# Patient Record
Sex: FEMALE | Race: WHITE | NOT HISPANIC OR LATINO | URBAN - METROPOLITAN AREA
[De-identification: names, ages, dates, MRNs, and addresses within clinical notes are randomized per-mention and may not be internally consistent; named-entity substitution may affect disease eponyms.]

---

## 2023-03-03 PROBLEM — O26.899 RH NEGATIVE, MATERNAL (HHS-HCC): Status: ACTIVE | Noted: 2023-03-03

## 2023-03-03 PROBLEM — E55.9 HYPOVITAMINOSIS D: Status: ACTIVE | Noted: 2023-03-03

## 2023-03-03 PROBLEM — E03.9 HYPOTHYROIDISM: Status: ACTIVE | Noted: 2023-03-03

## 2023-03-03 PROBLEM — F32.A DEPRESSION: Status: ACTIVE | Noted: 2023-03-03

## 2023-03-03 PROBLEM — O99.019 ANEMIA IN PREGNANCY (HHS-HCC): Status: ACTIVE | Noted: 2023-03-03

## 2023-03-03 PROBLEM — G47.00 INSOMNIA: Status: ACTIVE | Noted: 2023-03-03

## 2023-03-03 PROBLEM — O91.219 MASTITIS, OBSTETRIC (HHS-HCC): Status: ACTIVE | Noted: 2023-03-03

## 2023-03-03 PROBLEM — F41.1 GENERALIZED ANXIETY DISORDER: Status: ACTIVE | Noted: 2023-03-03

## 2023-03-03 PROBLEM — A74.9 CHLAMYDIA: Status: ACTIVE | Noted: 2023-03-03

## 2023-03-03 PROBLEM — Z67.91 RH NEGATIVE, MATERNAL (HHS-HCC): Status: ACTIVE | Noted: 2023-03-03

## 2023-03-03 RX ORDER — BUPROPION HYDROCHLORIDE 150 MG/1
1 TABLET ORAL DAILY
COMMUNITY
Start: 2021-02-26 | End: 2023-03-15 | Stop reason: SDUPTHER

## 2023-03-03 RX ORDER — LEVOTHYROXINE SODIUM 75 UG/1
1 TABLET ORAL DAILY
COMMUNITY
Start: 2021-10-05 | End: 2023-03-17 | Stop reason: ALTCHOICE

## 2023-03-03 RX ORDER — ESCITALOPRAM OXALATE 10 MG/1
1 TABLET ORAL DAILY
COMMUNITY
Start: 2021-02-26 | End: 2023-03-15 | Stop reason: SDUPTHER

## 2023-03-15 ENCOUNTER — LAB (OUTPATIENT)
Dept: LAB | Facility: LAB | Age: 28
End: 2023-03-15
Payer: COMMERCIAL

## 2023-03-15 ENCOUNTER — OFFICE VISIT (OUTPATIENT)
Dept: PRIMARY CARE | Facility: CLINIC | Age: 28
End: 2023-03-15
Payer: COMMERCIAL

## 2023-03-15 VITALS
HEIGHT: 61 IN | HEART RATE: 71 BPM | BODY MASS INDEX: 37.19 KG/M2 | SYSTOLIC BLOOD PRESSURE: 108 MMHG | OXYGEN SATURATION: 98 % | TEMPERATURE: 98 F | WEIGHT: 197 LBS | RESPIRATION RATE: 16 BRPM | DIASTOLIC BLOOD PRESSURE: 68 MMHG

## 2023-03-15 DIAGNOSIS — E55.9 HYPOVITAMINOSIS D: Primary | ICD-10-CM

## 2023-03-15 DIAGNOSIS — E03.9 HYPOTHYROIDISM, UNSPECIFIED TYPE: ICD-10-CM

## 2023-03-15 DIAGNOSIS — F32.A DEPRESSION, UNSPECIFIED DEPRESSION TYPE: ICD-10-CM

## 2023-03-15 DIAGNOSIS — Z00.00 HEALTH MAINTENANCE EXAMINATION: ICD-10-CM

## 2023-03-15 DIAGNOSIS — Z00.00 HEALTH MAINTENANCE EXAMINATION: Primary | ICD-10-CM

## 2023-03-15 DIAGNOSIS — E55.9 HYPOVITAMINOSIS D: ICD-10-CM

## 2023-03-15 LAB
ALANINE AMINOTRANSFERASE (SGPT) (U/L) IN SER/PLAS: 9 U/L (ref 7–45)
ALBUMIN (G/DL) IN SER/PLAS: 4.4 G/DL (ref 3.4–5)
ALKALINE PHOSPHATASE (U/L) IN SER/PLAS: 79 U/L (ref 33–110)
ANION GAP IN SER/PLAS: 11 MMOL/L (ref 10–20)
ASPARTATE AMINOTRANSFERASE (SGOT) (U/L) IN SER/PLAS: 14 U/L (ref 9–39)
BILIRUBIN TOTAL (MG/DL) IN SER/PLAS: 0.4 MG/DL (ref 0–1.2)
CALCIDIOL (25 OH VITAMIN D3) (NG/ML) IN SER/PLAS: 13 NG/ML
CALCIUM (MG/DL) IN SER/PLAS: 9.3 MG/DL (ref 8.6–10.3)
CARBON DIOXIDE, TOTAL (MMOL/L) IN SER/PLAS: 27 MMOL/L (ref 21–32)
CHLORIDE (MMOL/L) IN SER/PLAS: 103 MMOL/L (ref 98–107)
CREATININE (MG/DL) IN SER/PLAS: 0.81 MG/DL (ref 0.5–1.05)
ERYTHROCYTE DISTRIBUTION WIDTH (RATIO) BY AUTOMATED COUNT: 16 % (ref 11.5–14.5)
ERYTHROCYTE MEAN CORPUSCULAR HEMOGLOBIN CONCENTRATION (G/DL) BY AUTOMATED: 29.8 G/DL (ref 32–36)
ERYTHROCYTE MEAN CORPUSCULAR VOLUME (FL) BY AUTOMATED COUNT: 77 FL (ref 80–100)
ERYTHROCYTES (10*6/UL) IN BLOOD BY AUTOMATED COUNT: 4.64 X10E12/L (ref 4–5.2)
GFR FEMALE: >90 ML/MIN/1.73M2
GLUCOSE (MG/DL) IN SER/PLAS: 74 MG/DL (ref 74–99)
HEMATOCRIT (%) IN BLOOD BY AUTOMATED COUNT: 35.9 % (ref 36–46)
HEMOGLOBIN (G/DL) IN BLOOD: 10.7 G/DL (ref 12–16)
LEUKOCYTES (10*3/UL) IN BLOOD BY AUTOMATED COUNT: 6.3 X10E9/L (ref 4.4–11.3)
PLATELETS (10*3/UL) IN BLOOD AUTOMATED COUNT: 186 X10E9/L (ref 150–450)
POC APPEARANCE, URINE: CLEAR
POC BILIRUBIN, URINE: NEGATIVE
POC BLOOD, URINE: NEGATIVE
POC COLOR, URINE: YELLOW
POC GLUCOSE, URINE: NEGATIVE MG/DL
POC KETONES, URINE: NEGATIVE MG/DL
POC LEUKOCYTES, URINE: NEGATIVE
POC NITRITE,URINE: NEGATIVE
POC PH, URINE: 6 PH
POC PROTEIN, URINE: NEGATIVE MG/DL
POC SPECIFIC GRAVITY, URINE: 1.01
POC UROBILINOGEN, URINE: 0.2 EU/DL
POTASSIUM (MMOL/L) IN SER/PLAS: 3.9 MMOL/L (ref 3.5–5.3)
PROTEIN TOTAL: 7.4 G/DL (ref 6.4–8.2)
SODIUM (MMOL/L) IN SER/PLAS: 137 MMOL/L (ref 136–145)
THYROTROPIN (MIU/L) IN SER/PLAS BY DETECTION LIMIT <= 0.05 MIU/L: 16.95 MIU/L (ref 0.44–3.98)
THYROXINE (T4) FREE (NG/DL) IN SER/PLAS: 0.7 NG/DL (ref 0.61–1.12)
UREA NITROGEN (MG/DL) IN SER/PLAS: 14 MG/DL (ref 6–23)

## 2023-03-15 PROCEDURE — 84439 ASSAY OF FREE THYROXINE: CPT

## 2023-03-15 PROCEDURE — 80053 COMPREHEN METABOLIC PANEL: CPT

## 2023-03-15 PROCEDURE — 1036F TOBACCO NON-USER: CPT | Performed by: FAMILY MEDICINE

## 2023-03-15 PROCEDURE — 36415 COLL VENOUS BLD VENIPUNCTURE: CPT

## 2023-03-15 PROCEDURE — 85027 COMPLETE CBC AUTOMATED: CPT

## 2023-03-15 PROCEDURE — 84443 ASSAY THYROID STIM HORMONE: CPT

## 2023-03-15 PROCEDURE — 82306 VITAMIN D 25 HYDROXY: CPT

## 2023-03-15 PROCEDURE — 81002 URINALYSIS NONAUTO W/O SCOPE: CPT | Performed by: FAMILY MEDICINE

## 2023-03-15 PROCEDURE — 99395 PREV VISIT EST AGE 18-39: CPT | Performed by: FAMILY MEDICINE

## 2023-03-15 RX ORDER — ESCITALOPRAM OXALATE 10 MG/1
10 TABLET ORAL DAILY
Qty: 90 TABLET | Refills: 1 | Status: SHIPPED | OUTPATIENT
Start: 2023-03-15 | End: 2023-12-28 | Stop reason: SDUPTHER

## 2023-03-15 RX ORDER — BUPROPION HYDROCHLORIDE 150 MG/1
150 TABLET ORAL DAILY
Qty: 90 TABLET | Refills: 1 | Status: SHIPPED | OUTPATIENT
Start: 2023-03-15 | End: 2023-12-28 | Stop reason: SDUPTHER

## 2023-03-15 ASSESSMENT — PATIENT HEALTH QUESTIONNAIRE - PHQ9
1. LITTLE INTEREST OR PLEASURE IN DOING THINGS: NOT AT ALL
2. FEELING DOWN, DEPRESSED OR HOPELESS: NOT AT ALL
SUM OF ALL RESPONSES TO PHQ9 QUESTIONS 1 AND 2: 0

## 2023-03-15 NOTE — PROGRESS NOTES
"Subjective   Patient ID: Yesy rOozco is a 27 y.o. female who presents for Annual Exam.    HPI   Dentist and Eye Doctor appointments: Due for both  Immunizations: defers flu shot  Diet: does \"ok\"   Exercise: walks her son   Supplements: None  Menstrual Cycles: Hasn't had one - still nursing   Pregnancy History:   Cancer Screening:    Cervical: Due; last was , wnl   Breast: None   Colon: None   Skin: None         Review of Systems    Objective   /68   Pulse 71   Temp 36.7 °C (98 °F)   Resp 16   Ht 1.549 m (5' 1\")   Wt 89.4 kg (197 lb)   SpO2 98%   Breastfeeding Yes   BMI 37.22 kg/m²     Physical Exam  Constitutional:       General: She is not in acute distress.     Appearance: She is well-developed. She is not diaphoretic.   HENT:      Head: Normocephalic and atraumatic.      Nose: Nose normal.   Eyes:      General: No scleral icterus.     Pupils: Pupils are equal, round, and reactive to light.   Neck:      Thyroid: No thyromegaly.      Vascular: No JVD.   Cardiovascular:      Rate and Rhythm: Normal rate and regular rhythm.      Heart sounds: Normal heart sounds. No murmur heard.     No friction rub. No gallop.   Pulmonary:      Effort: Pulmonary effort is normal. No respiratory distress.      Breath sounds: Normal breath sounds. No wheezing or rales.   Chest:      Chest wall: No tenderness.   Abdominal:      General: Bowel sounds are normal. There is no distension.      Palpations: Abdomen is soft. There is no mass.      Tenderness: There is no abdominal tenderness. There is no rebound.   Musculoskeletal:         General: Normal range of motion.      Cervical back: Normal range of motion and neck supple.   Lymphadenopathy:      Cervical: No cervical adenopathy.   Skin:     General: Skin is warm and dry.   Neurological:      Mental Status: She is alert and oriented to person, place, and time.      Deep Tendon Reflexes: Reflexes normal.         Assessment/Plan   Diagnoses and all orders for " this visit:  Health maintenance examination  -     POCT UA (nonautomated) manually resulted  Hypothyroidism, unspecified type  Hypovitaminosis D  Breast feeding status of mother

## 2023-03-15 NOTE — PATIENT INSTRUCTIONS
Today we performed your Annual Physical Exam.  Your vaccines are up to date.    Today we followed up on your Thyroid medication - we will check your labs and adjust the medication accordingly.  Our goal will be to have a TSH between 2 and 3.  We will closely monitor your symptoms to determine the optimal dosing.     We also followed up on your depression and anxiety and you are doing well so refills sent for your medications.     RTC for your pap smear only    Follow up in 6 months for a medication check

## 2023-03-17 RX ORDER — ERGOCALCIFEROL 1.25 MG/1
50000 CAPSULE ORAL
Qty: 12 CAPSULE | Refills: 0 | Status: SHIPPED | OUTPATIENT
Start: 2023-03-17 | End: 2023-06-09

## 2023-03-17 RX ORDER — LEVOTHYROXINE SODIUM 100 UG/1
100 TABLET ORAL DAILY
Qty: 30 TABLET | Refills: 5 | Status: SHIPPED | OUTPATIENT
Start: 2023-03-17 | End: 2023-12-08 | Stop reason: SDUPTHER

## 2023-03-24 ENCOUNTER — APPOINTMENT (OUTPATIENT)
Dept: PRIMARY CARE | Facility: CLINIC | Age: 28
End: 2023-03-24

## 2023-12-08 ENCOUNTER — TELEPHONE (OUTPATIENT)
Dept: PRIMARY CARE | Facility: CLINIC | Age: 28
End: 2023-12-08

## 2023-12-08 DIAGNOSIS — E03.9 HYPOTHYROIDISM, UNSPECIFIED TYPE: ICD-10-CM

## 2023-12-09 RX ORDER — LEVOTHYROXINE SODIUM 100 UG/1
100 TABLET ORAL DAILY
Qty: 30 TABLET | Refills: 0 | Status: SHIPPED | OUTPATIENT
Start: 2023-12-09 | End: 2024-01-30 | Stop reason: ALTCHOICE

## 2023-12-27 DIAGNOSIS — F32.A DEPRESSION, UNSPECIFIED DEPRESSION TYPE: ICD-10-CM

## 2023-12-27 DIAGNOSIS — Z00.00 HEALTH MAINTENANCE EXAMINATION: ICD-10-CM

## 2023-12-28 RX ORDER — BUPROPION HYDROCHLORIDE 150 MG/1
150 TABLET ORAL DAILY
Qty: 90 TABLET | Refills: 0 | Status: SHIPPED | OUTPATIENT
Start: 2023-12-28 | End: 2024-04-03

## 2023-12-28 RX ORDER — ESCITALOPRAM OXALATE 10 MG/1
10 TABLET ORAL DAILY
Qty: 90 TABLET | Refills: 0 | Status: SHIPPED | OUTPATIENT
Start: 2023-12-28 | End: 2024-04-03

## 2024-01-03 ENCOUNTER — OFFICE VISIT (OUTPATIENT)
Dept: PRIMARY CARE | Facility: CLINIC | Age: 29
End: 2024-01-03
Payer: COMMERCIAL

## 2024-01-03 VITALS
RESPIRATION RATE: 16 BRPM | SYSTOLIC BLOOD PRESSURE: 114 MMHG | BODY MASS INDEX: 36.44 KG/M2 | WEIGHT: 193 LBS | HEART RATE: 72 BPM | HEIGHT: 61 IN | DIASTOLIC BLOOD PRESSURE: 72 MMHG | OXYGEN SATURATION: 98 % | TEMPERATURE: 97.9 F

## 2024-01-03 DIAGNOSIS — D64.9 ANEMIA, UNSPECIFIED TYPE: ICD-10-CM

## 2024-01-03 DIAGNOSIS — E03.9 HYPOTHYROIDISM, UNSPECIFIED TYPE: Primary | ICD-10-CM

## 2024-01-03 DIAGNOSIS — E55.9 HYPOVITAMINOSIS D: ICD-10-CM

## 2024-01-03 PROCEDURE — 99214 OFFICE O/P EST MOD 30 MIN: CPT | Performed by: FAMILY MEDICINE

## 2024-01-03 PROCEDURE — 1036F TOBACCO NON-USER: CPT | Performed by: FAMILY MEDICINE

## 2024-01-03 ASSESSMENT — ENCOUNTER SYMPTOMS: DEPRESSION: 1

## 2024-01-03 NOTE — PATIENT INSTRUCTIONS
Today we followed up on your Thyroid medication - we will check your labs and adjust the medication accordingly.  Our goal will be to have a TSH between 2 and 3.  We will closely monitor your symptoms to determine the optimal dosing.     We also followed up on your anemia and you will need to have your cbc and iron and b12 checked    Lastly you are due for Vit D follow up so I have ordered this too

## 2024-01-03 NOTE — PROGRESS NOTES
"Subjective   Patient ID: Yesy Orozco is a 28 y.o. female who presents for Depression (Follow up ) and Hypothyroidism (Follow up ).    She hasn't been taking a prenatal vitamin she doesn't eat any red meat or pork or fish she does poultry sometimes.  She does eat leafy greens.  She is still nursing.    She was really anemic during pregnancy.  She was a vegetarian strict before. Her baby is 21 months old    She took 12 weeks of Vit D    She is due to have her thyroid labs checked but she took her meds already today.  She was out of her meds for a few days because she got sick and then she ran out.  She is back on it now and needs to have her labs checked.    Depression       Review of Systems   Psychiatric/Behavioral:  Positive for depression.        Objective   /72   Pulse 72   Temp 36.6 °C (97.9 °F)   Resp 16   Ht 1.549 m (5' 1\")   Wt 87.5 kg (193 lb)   SpO2 98%   Breastfeeding Yes   BMI 36.47 kg/m²     Physical Exam  Constitutional:       Appearance: Normal appearance.   HENT:      Head: Normocephalic and atraumatic.      Right Ear: Tympanic membrane normal.      Left Ear: Tympanic membrane normal.      Mouth/Throat:      Mouth: Mucous membranes are moist.   Eyes:      Pupils: Pupils are equal, round, and reactive to light.   Neck:      Thyroid: No thyroid mass, thyromegaly or thyroid tenderness.   Cardiovascular:      Rate and Rhythm: Normal rate and regular rhythm.   Pulmonary:      Effort: Pulmonary effort is normal.      Breath sounds: Normal breath sounds.   Musculoskeletal:      Cervical back: Normal range of motion and neck supple.   Skin:     General: Skin is warm and dry.   Neurological:      Mental Status: She is alert.         Assessment/Plan   Diagnoses and all orders for this visit:  Hypothyroidism, unspecified type  -     Thyroid Stimulating Hormone; Future  -     Thyroxine, Free; Future  Anemia, unspecified type  -     CBC and Auto Differential; Future  -     Iron and TIBC; Future  -  "    Vitamin B12; Future  -     Vitamin D 25 hydroxy; Future  Hypovitaminosis D

## 2024-01-26 ENCOUNTER — LAB (OUTPATIENT)
Dept: LAB | Facility: LAB | Age: 29
End: 2024-01-26
Payer: COMMERCIAL

## 2024-01-26 DIAGNOSIS — D64.9 ANEMIA, UNSPECIFIED TYPE: ICD-10-CM

## 2024-01-26 DIAGNOSIS — E03.9 HYPOTHYROIDISM, UNSPECIFIED TYPE: ICD-10-CM

## 2024-01-26 LAB
25(OH)D3 SERPL-MCNC: 13 NG/ML (ref 30–100)
BASOPHILS # BLD AUTO: 0.03 X10*3/UL (ref 0–0.1)
BASOPHILS NFR BLD AUTO: 0.5 %
EOSINOPHIL # BLD AUTO: 0.11 X10*3/UL (ref 0–0.7)
EOSINOPHIL NFR BLD AUTO: 1.9 %
ERYTHROCYTE [DISTWIDTH] IN BLOOD BY AUTOMATED COUNT: 14.1 % (ref 11.5–14.5)
HCT VFR BLD AUTO: 35.3 % (ref 36–46)
HGB BLD-MCNC: 11 G/DL (ref 12–16)
IMM GRANULOCYTES # BLD AUTO: 0.01 X10*3/UL (ref 0–0.7)
IMM GRANULOCYTES NFR BLD AUTO: 0.2 % (ref 0–0.9)
IRON SATN MFR SERPL: 8 % (ref 25–45)
IRON SERPL-MCNC: 31 UG/DL (ref 35–150)
LYMPHOCYTES # BLD AUTO: 1.38 X10*3/UL (ref 1.2–4.8)
LYMPHOCYTES NFR BLD AUTO: 23.5 %
MCH RBC QN AUTO: 25.3 PG (ref 26–34)
MCHC RBC AUTO-ENTMCNC: 31.2 G/DL (ref 32–36)
MCV RBC AUTO: 81 FL (ref 80–100)
MONOCYTES # BLD AUTO: 0.31 X10*3/UL (ref 0.1–1)
MONOCYTES NFR BLD AUTO: 5.3 %
NEUTROPHILS # BLD AUTO: 4.04 X10*3/UL (ref 1.2–7.7)
NEUTROPHILS NFR BLD AUTO: 68.6 %
NRBC BLD-RTO: 0 /100 WBCS (ref 0–0)
PLATELET # BLD AUTO: 169 X10*3/UL (ref 150–450)
RBC # BLD AUTO: 4.35 X10*6/UL (ref 4–5.2)
T4 FREE SERPL-MCNC: 0.73 NG/DL (ref 0.61–1.12)
TIBC SERPL-MCNC: 413 UG/DL (ref 240–445)
TSH SERPL-ACNC: 8.74 MIU/L (ref 0.44–3.98)
UIBC SERPL-MCNC: 382 UG/DL (ref 110–370)
VIT B12 SERPL-MCNC: 169 PG/ML (ref 211–911)
WBC # BLD AUTO: 5.9 X10*3/UL (ref 4.4–11.3)

## 2024-01-26 PROCEDURE — 85025 COMPLETE CBC W/AUTO DIFF WBC: CPT

## 2024-01-26 PROCEDURE — 83550 IRON BINDING TEST: CPT

## 2024-01-26 PROCEDURE — 82607 VITAMIN B-12: CPT

## 2024-01-26 PROCEDURE — 84439 ASSAY OF FREE THYROXINE: CPT

## 2024-01-26 PROCEDURE — 83540 ASSAY OF IRON: CPT

## 2024-01-26 PROCEDURE — 36415 COLL VENOUS BLD VENIPUNCTURE: CPT

## 2024-01-26 PROCEDURE — 84443 ASSAY THYROID STIM HORMONE: CPT

## 2024-01-26 PROCEDURE — 82306 VITAMIN D 25 HYDROXY: CPT

## 2024-01-27 DIAGNOSIS — E55.9 HYPOVITAMINOSIS D: Primary | ICD-10-CM

## 2024-01-27 RX ORDER — ERGOCALCIFEROL 1.25 MG/1
50000 CAPSULE ORAL
Qty: 12 CAPSULE | Refills: 0 | Status: SHIPPED | OUTPATIENT
Start: 2024-01-27 | End: 2024-04-20

## 2024-01-29 ENCOUNTER — TELEPHONE (OUTPATIENT)
Dept: PRIMARY CARE | Facility: CLINIC | Age: 29
End: 2024-01-29
Payer: COMMERCIAL

## 2024-01-29 DIAGNOSIS — E03.9 HYPOTHYROIDISM, UNSPECIFIED TYPE: ICD-10-CM

## 2024-01-29 RX ORDER — LEVOTHYROXINE SODIUM 100 UG/1
100 TABLET ORAL DAILY
Qty: 30 TABLET | Refills: 5 | Status: CANCELLED | OUTPATIENT
Start: 2024-01-29 | End: 2025-01-28

## 2024-01-30 DIAGNOSIS — E03.9 HYPOTHYROIDISM, UNSPECIFIED TYPE: Primary | ICD-10-CM

## 2024-01-30 RX ORDER — LEVOTHYROXINE SODIUM 112 UG/1
112 TABLET ORAL DAILY
Qty: 30 TABLET | Refills: 5 | Status: SHIPPED | OUTPATIENT
Start: 2024-01-30 | End: 2025-01-29

## 2024-04-03 DIAGNOSIS — Z00.00 HEALTH MAINTENANCE EXAMINATION: ICD-10-CM

## 2024-04-03 DIAGNOSIS — F32.A DEPRESSION, UNSPECIFIED DEPRESSION TYPE: ICD-10-CM

## 2024-04-03 RX ORDER — BUPROPION HYDROCHLORIDE 150 MG/1
150 TABLET ORAL DAILY
Qty: 90 TABLET | Refills: 0 | Status: SHIPPED | OUTPATIENT
Start: 2024-04-03

## 2024-04-03 RX ORDER — ESCITALOPRAM OXALATE 10 MG/1
10 TABLET ORAL DAILY
Qty: 90 TABLET | Refills: 0 | Status: SHIPPED | OUTPATIENT
Start: 2024-04-03

## 2024-07-17 DIAGNOSIS — Z00.00 HEALTH MAINTENANCE EXAMINATION: ICD-10-CM

## 2024-07-17 DIAGNOSIS — F32.A DEPRESSION, UNSPECIFIED DEPRESSION TYPE: ICD-10-CM

## 2024-07-17 RX ORDER — BUPROPION HYDROCHLORIDE 150 MG/1
150 TABLET ORAL DAILY
Qty: 90 TABLET | Refills: 0 | Status: SHIPPED | OUTPATIENT
Start: 2024-07-17

## 2024-07-17 RX ORDER — ESCITALOPRAM OXALATE 10 MG/1
10 TABLET ORAL DAILY
Qty: 90 TABLET | Refills: 0 | Status: SHIPPED | OUTPATIENT
Start: 2024-07-17

## 2024-08-10 DIAGNOSIS — E03.9 HYPOTHYROIDISM, UNSPECIFIED TYPE: ICD-10-CM

## 2024-08-12 RX ORDER — LEVOTHYROXINE SODIUM 112 UG/1
112 TABLET ORAL DAILY
Qty: 30 TABLET | Refills: 0 | Status: SHIPPED | OUTPATIENT
Start: 2024-08-12

## 2024-09-17 ENCOUNTER — APPOINTMENT (OUTPATIENT)
Dept: PRIMARY CARE | Facility: CLINIC | Age: 29
End: 2024-09-17
Payer: COMMERCIAL

## 2024-09-17 VITALS
BODY MASS INDEX: 39.95 KG/M2 | OXYGEN SATURATION: 98 % | DIASTOLIC BLOOD PRESSURE: 80 MMHG | WEIGHT: 211.6 LBS | SYSTOLIC BLOOD PRESSURE: 118 MMHG | HEART RATE: 83 BPM | HEIGHT: 61 IN | TEMPERATURE: 98.6 F | RESPIRATION RATE: 16 BRPM

## 2024-09-17 DIAGNOSIS — E03.9 HYPOTHYROIDISM, UNSPECIFIED TYPE: ICD-10-CM

## 2024-09-17 DIAGNOSIS — Z31.69 PRE-CONCEPTION COUNSELING: ICD-10-CM

## 2024-09-17 DIAGNOSIS — Z12.4 CERVICAL CANCER SCREENING: ICD-10-CM

## 2024-09-17 DIAGNOSIS — Z00.00 HEALTH MAINTENANCE EXAMINATION: Primary | ICD-10-CM

## 2024-09-17 LAB
POC APPEARANCE, URINE: CLEAR
POC BILIRUBIN, URINE: NEGATIVE
POC BLOOD, URINE: NEGATIVE
POC COLOR, URINE: YELLOW
POC GLUCOSE, URINE: NEGATIVE MG/DL
POC KETONES, URINE: NEGATIVE MG/DL
POC LEUKOCYTES, URINE: NEGATIVE
POC NITRITE,URINE: NEGATIVE
POC PH, URINE: 5 PH
POC PROTEIN, URINE: NEGATIVE MG/DL
POC SPECIFIC GRAVITY, URINE: 1.01
POC UROBILINOGEN, URINE: 0.2 EU/DL
PREGNANCY TEST URINE, POC: NEGATIVE

## 2024-09-17 PROCEDURE — 3008F BODY MASS INDEX DOCD: CPT | Performed by: FAMILY MEDICINE

## 2024-09-17 PROCEDURE — 1036F TOBACCO NON-USER: CPT | Performed by: FAMILY MEDICINE

## 2024-09-17 PROCEDURE — 81025 URINE PREGNANCY TEST: CPT | Performed by: FAMILY MEDICINE

## 2024-09-17 PROCEDURE — 99395 PREV VISIT EST AGE 18-39: CPT | Performed by: FAMILY MEDICINE

## 2024-09-17 PROCEDURE — 87624 HPV HI-RISK TYP POOLED RSLT: CPT

## 2024-09-17 PROCEDURE — 81002 URINALYSIS NONAUTO W/O SCOPE: CPT | Performed by: FAMILY MEDICINE

## 2024-09-17 ASSESSMENT — PATIENT HEALTH QUESTIONNAIRE - PHQ9
2. FEELING DOWN, DEPRESSED OR HOPELESS: NOT AT ALL
SUM OF ALL RESPONSES TO PHQ9 QUESTIONS 1 AND 2: 0
1. LITTLE INTEREST OR PLEASURE IN DOING THINGS: NOT AT ALL

## 2024-09-17 NOTE — PROGRESS NOTES
"Subjective   Patient ID: Yesy Orozco is a 29 y.o. female who presents for Annual Exam.    Dentist and Eye Doctor appointments: Not UTD doesn't have dental insurance.    Immunizations: due for flu   Diet: breastfeeding  Exercise: walk with her son  Supplements: PNV  Menstrual Cycles: regular  Sexually Active: Yes, No std concerns  Pregnancy History:  Cancer Screening:    Cervical: DUE   Breast: will be due when done breastfeeding   Colon: N/A   Skin: wears sunscreen on face   DEXA: N/A        HPI     Review of Systems    Objective   /80 (BP Location: Right arm, Patient Position: Sitting)   Pulse 83   Temp 37 °C (98.6 °F)   Resp 16   Ht 1.549 m (5' 1\")   Wt 96 kg (211 lb 9.6 oz)   LMP 2024 (Exact Date)   SpO2 98%   Breastfeeding Yes   BMI 39.98 kg/m²     Physical Exam  Constitutional:       General: She is not in acute distress.     Appearance: She is well-developed. She is not diaphoretic.   HENT:      Head: Normocephalic and atraumatic.      Right Ear: Tympanic membrane normal.      Left Ear: Tympanic membrane normal.      Nose: Nose normal.      Mouth/Throat:      Mouth: Mucous membranes are moist.   Eyes:      General: No scleral icterus.     Pupils: Pupils are equal, round, and reactive to light.   Neck:      Thyroid: No thyromegaly.      Vascular: No JVD.   Cardiovascular:      Rate and Rhythm: Normal rate and regular rhythm.      Heart sounds: Normal heart sounds. No murmur heard.     No friction rub. No gallop.   Pulmonary:      Effort: Pulmonary effort is normal. No respiratory distress.      Breath sounds: Normal breath sounds. No wheezing or rales.   Chest:      Chest wall: No tenderness.   Breasts:     Right: Normal.      Left: Normal.   Abdominal:      General: Bowel sounds are normal. There is no distension.      Palpations: Abdomen is soft. There is no mass.      Tenderness: There is no abdominal tenderness. There is no rebound.   Genitourinary:     General: Normal vulva.     "  Vagina: Normal.      Cervix: Normal.      Uterus: Normal.       Adnexa: Right adnexa normal and left adnexa normal.   Musculoskeletal:         General: Normal range of motion.      Cervical back: Normal range of motion and neck supple.   Lymphadenopathy:      Cervical: No cervical adenopathy.   Skin:     General: Skin is warm and dry.   Neurological:      General: No focal deficit present.      Mental Status: She is alert and oriented to person, place, and time.      Deep Tendon Reflexes: Reflexes normal.   Psychiatric:         Mood and Affect: Mood normal.         Behavior: Behavior normal.         Assessment/Plan   Diagnoses and all orders for this visit:  Health maintenance examination  -     POCT UA (nonautomated) manually resulted  -     CBC; Future  -     Comprehensive Metabolic Panel; Future  -     Vitamin D 25 hydroxy; Future  Cervical cancer screening  Comments:  2020  Orders:  -     THINPREP PAP TEST  Hypothyroidism, unspecified type  -     Thyroid Stimulating Hormone; Future  -     Thyroxine, Free; Future  Pre-conception counseling  -     POCT Pregnancy, Urine manually resulted

## 2024-09-17 NOTE — PATIENT INSTRUCTIONS
Today we performed your Annual Physical Exam.  Your preventative health care, labs and vaccinations have been reviewed and are up to date.      Flu shot is recommended.    Today we followed up on your Thyroid medication - we will check your labs and adjust the medication accordingly.  Our goal will be to have a TSH between 2 and 3.  We will closely monitor your symptoms to determine the optimal dosing.     Follow up in 6 months for a medication check    Follow up in 1 year for your Complete Physical Exam

## 2024-11-01 ENCOUNTER — LAB (OUTPATIENT)
Dept: LAB | Facility: LAB | Age: 29
End: 2024-11-01
Payer: COMMERCIAL

## 2024-11-01 DIAGNOSIS — R87.612 LGSIL OF CERVIX OF UNDETERMINED SIGNIFICANCE: ICD-10-CM

## 2024-11-01 DIAGNOSIS — E03.9 HYPOTHYROIDISM, UNSPECIFIED TYPE: ICD-10-CM

## 2024-11-01 DIAGNOSIS — E55.9 HYPOVITAMINOSIS D: ICD-10-CM

## 2024-11-01 DIAGNOSIS — D64.9 ANEMIA, UNSPECIFIED TYPE: Primary | ICD-10-CM

## 2024-11-01 DIAGNOSIS — Z00.00 HEALTH MAINTENANCE EXAMINATION: ICD-10-CM

## 2024-11-01 LAB
25(OH)D3 SERPL-MCNC: 24 NG/ML (ref 30–100)
ALBUMIN SERPL BCP-MCNC: 4.3 G/DL (ref 3.4–5)
ALP SERPL-CCNC: 55 U/L (ref 33–110)
ALT SERPL W P-5'-P-CCNC: 10 U/L (ref 7–45)
ANION GAP SERPL CALC-SCNC: 13 MMOL/L (ref 10–20)
AST SERPL W P-5'-P-CCNC: 12 U/L (ref 9–39)
BILIRUB SERPL-MCNC: 0.4 MG/DL (ref 0–1.2)
BUN SERPL-MCNC: 13 MG/DL (ref 6–23)
CALCIUM SERPL-MCNC: 8.9 MG/DL (ref 8.6–10.6)
CHLORIDE SERPL-SCNC: 103 MMOL/L (ref 98–107)
CO2 SERPL-SCNC: 26 MMOL/L (ref 21–32)
CREAT SERPL-MCNC: 0.77 MG/DL (ref 0.5–1.05)
EGFRCR SERPLBLD CKD-EPI 2021: >90 ML/MIN/1.73M*2
ERYTHROCYTE [DISTWIDTH] IN BLOOD BY AUTOMATED COUNT: 14.4 % (ref 11.5–14.5)
GLUCOSE SERPL-MCNC: 86 MG/DL (ref 74–99)
HCT VFR BLD AUTO: 35.9 % (ref 36–46)
HGB BLD-MCNC: 11.3 G/DL (ref 12–16)
IRON SATN MFR SERPL: 8 % (ref 25–45)
IRON SERPL-MCNC: 35 UG/DL (ref 35–150)
MCH RBC QN AUTO: 24.5 PG (ref 26–34)
MCHC RBC AUTO-ENTMCNC: 31.5 G/DL (ref 32–36)
MCV RBC AUTO: 78 FL (ref 80–100)
NRBC BLD-RTO: 0 /100 WBCS (ref 0–0)
PLATELET # BLD AUTO: 214 X10*3/UL (ref 150–450)
POTASSIUM SERPL-SCNC: 3.8 MMOL/L (ref 3.5–5.3)
PROT SERPL-MCNC: 6.9 G/DL (ref 6.4–8.2)
RBC # BLD AUTO: 4.62 X10*6/UL (ref 4–5.2)
SODIUM SERPL-SCNC: 138 MMOL/L (ref 136–145)
T4 FREE SERPL-MCNC: 1.1 NG/DL (ref 0.78–1.48)
TIBC SERPL-MCNC: 435 UG/DL (ref 240–445)
TSH SERPL-ACNC: 4 MIU/L (ref 0.44–3.98)
UIBC SERPL-MCNC: 400 UG/DL (ref 110–370)
VIT B12 SERPL-MCNC: 310 PG/ML (ref 211–911)
WBC # BLD AUTO: 7.3 X10*3/UL (ref 4.4–11.3)

## 2024-11-01 PROCEDURE — 85027 COMPLETE CBC AUTOMATED: CPT

## 2024-11-01 PROCEDURE — 82607 VITAMIN B-12: CPT

## 2024-11-01 PROCEDURE — 80053 COMPREHEN METABOLIC PANEL: CPT

## 2024-11-01 PROCEDURE — 84439 ASSAY OF FREE THYROXINE: CPT

## 2024-11-01 PROCEDURE — 84443 ASSAY THYROID STIM HORMONE: CPT

## 2024-11-01 PROCEDURE — 83540 ASSAY OF IRON: CPT

## 2024-11-01 PROCEDURE — 36415 COLL VENOUS BLD VENIPUNCTURE: CPT

## 2024-11-01 PROCEDURE — 82306 VITAMIN D 25 HYDROXY: CPT

## 2024-11-01 PROCEDURE — 83550 IRON BINDING TEST: CPT

## 2024-11-01 RX ORDER — ERGOCALCIFEROL 1.25 MG/1
50000 CAPSULE ORAL
Qty: 12 CAPSULE | Refills: 0 | Status: SHIPPED | OUTPATIENT
Start: 2024-11-03 | End: 2025-01-26

## 2024-11-04 DIAGNOSIS — E55.9 HYPOVITAMINOSIS D: Primary | ICD-10-CM

## 2024-11-04 DIAGNOSIS — E03.9 HYPOTHYROIDISM, UNSPECIFIED TYPE: ICD-10-CM

## 2024-11-04 RX ORDER — LEVOTHYROXINE SODIUM 125 UG/1
125 TABLET ORAL 3 TIMES WEEKLY
Qty: 36 TABLET | Refills: 1 | Status: SHIPPED | OUTPATIENT
Start: 2024-11-04 | End: 2025-11-04

## 2024-11-04 RX ORDER — ERGOCALCIFEROL 1.25 MG/1
50000 CAPSULE ORAL WEEKLY
Qty: 12 CAPSULE | Refills: 0 | Status: SHIPPED | OUTPATIENT
Start: 2024-11-04 | End: 2025-01-27

## 2024-11-04 RX ORDER — LEVOTHYROXINE SODIUM 112 UG/1
112 TABLET ORAL
Qty: 48 TABLET | Refills: 1 | Status: SHIPPED | OUTPATIENT
Start: 2024-11-05 | End: 2025-11-05

## 2024-11-11 DIAGNOSIS — F32.A DEPRESSION, UNSPECIFIED DEPRESSION TYPE: Primary | ICD-10-CM

## 2024-11-11 DIAGNOSIS — Z00.00 HEALTH MAINTENANCE EXAMINATION: ICD-10-CM

## 2024-11-11 RX ORDER — BUPROPION HYDROCHLORIDE 150 MG/1
150 TABLET ORAL DAILY
Qty: 90 TABLET | Refills: 1 | Status: SHIPPED | OUTPATIENT
Start: 2024-11-11

## 2024-11-11 RX ORDER — ESCITALOPRAM OXALATE 10 MG/1
10 TABLET ORAL DAILY
Qty: 90 TABLET | Refills: 1 | Status: SHIPPED | OUTPATIENT
Start: 2024-11-11

## 2024-12-09 ENCOUNTER — APPOINTMENT (OUTPATIENT)
Dept: OBSTETRICS AND GYNECOLOGY | Facility: CLINIC | Age: 29
End: 2024-12-09
Payer: COMMERCIAL

## 2024-12-09 VITALS — SYSTOLIC BLOOD PRESSURE: 105 MMHG | BODY MASS INDEX: 41.53 KG/M2 | DIASTOLIC BLOOD PRESSURE: 73 MMHG | WEIGHT: 219.8 LBS

## 2024-12-09 DIAGNOSIS — E03.9 HYPOTHYROIDISM, UNSPECIFIED TYPE: ICD-10-CM

## 2024-12-09 DIAGNOSIS — O21.9 NAUSEA AND VOMITING IN PREGNANCY PRIOR TO 22 WEEKS GESTATION: ICD-10-CM

## 2024-12-09 DIAGNOSIS — O26.891 RH NEGATIVE STATUS DURING PREGNANCY IN FIRST TRIMESTER (HHS-HCC): ICD-10-CM

## 2024-12-09 DIAGNOSIS — Z67.91 RH NEGATIVE STATUS DURING PREGNANCY IN FIRST TRIMESTER (HHS-HCC): ICD-10-CM

## 2024-12-09 DIAGNOSIS — Z34.90 PRENATAL CARE, ANTEPARTUM (HHS-HCC): ICD-10-CM

## 2024-12-09 DIAGNOSIS — Z87.59 HISTORY OF VACUUM EXTRACTION ASSISTED DELIVERY: ICD-10-CM

## 2024-12-09 DIAGNOSIS — F41.1 GENERALIZED ANXIETY DISORDER: ICD-10-CM

## 2024-12-09 DIAGNOSIS — Z3A.01 7 WEEKS GESTATION OF PREGNANCY (HHS-HCC): Primary | ICD-10-CM

## 2024-12-09 PROBLEM — Z00.00 HEALTH MAINTENANCE EXAMINATION: Status: RESOLVED | Noted: 2023-03-15 | Resolved: 2024-12-09

## 2024-12-09 PROBLEM — O99.019 ANEMIA IN PREGNANCY (HHS-HCC): Status: RESOLVED | Noted: 2023-03-03 | Resolved: 2024-12-09

## 2024-12-09 PROBLEM — F32.A DEPRESSION: Status: RESOLVED | Noted: 2023-03-03 | Resolved: 2024-12-09

## 2024-12-09 PROBLEM — G47.00 INSOMNIA: Status: RESOLVED | Noted: 2023-03-03 | Resolved: 2024-12-09

## 2024-12-09 PROBLEM — O91.219 MASTITIS, OBSTETRIC (HHS-HCC): Status: RESOLVED | Noted: 2023-03-03 | Resolved: 2024-12-09

## 2024-12-09 PROBLEM — A74.9 CHLAMYDIA: Status: RESOLVED | Noted: 2023-03-03 | Resolved: 2024-12-09

## 2024-12-09 PROBLEM — E55.9 HYPOVITAMINOSIS D: Status: RESOLVED | Noted: 2023-03-03 | Resolved: 2024-12-09

## 2024-12-09 PROBLEM — R87.612 LGSIL OF CERVIX OF UNDETERMINED SIGNIFICANCE: Status: RESOLVED | Noted: 2024-11-01 | Resolved: 2024-12-09

## 2024-12-09 PROBLEM — D64.9 ANEMIA: Status: RESOLVED | Noted: 2024-01-03 | Resolved: 2024-12-09

## 2024-12-09 PROCEDURE — 87591 N.GONORRHOEAE DNA AMP PROB: CPT

## 2024-12-09 PROCEDURE — 87491 CHLMYD TRACH DNA AMP PROBE: CPT

## 2024-12-09 PROCEDURE — 0500F INITIAL PRENATAL CARE VISIT: CPT | Performed by: OBSTETRICS & GYNECOLOGY

## 2024-12-09 PROCEDURE — 87086 URINE CULTURE/COLONY COUNT: CPT

## 2024-12-09 RX ORDER — ONDANSETRON 4 MG/1
4 TABLET, FILM COATED ORAL EVERY 8 HOURS PRN
Qty: 30 TABLET | Refills: 1 | Status: SHIPPED | OUTPATIENT
Start: 2024-12-09 | End: 2025-02-07

## 2024-12-09 RX ORDER — PNV 119/IRON FUM/FOLIC ACID 29 MG-1 MG
TABLET ORAL
COMMUNITY

## 2024-12-09 ASSESSMENT — EDINBURGH POSTNATAL DEPRESSION SCALE (EPDS)
I HAVE BEEN ANXIOUS OR WORRIED FOR NO GOOD REASON: YES, SOMETIMES
THE THOUGHT OF HARMING MYSELF HAS OCCURRED TO ME: NEVER
TOTAL SCORE: 8
I HAVE BLAMED MYSELF UNNECESSARILY WHEN THINGS WENT WRONG: YES, SOME OF THE TIME
THINGS HAVE BEEN GETTING ON TOP OF ME: NO, MOST OF THE TIME I HAVE COPED QUITE WELL
I HAVE FELT SCARED OR PANICKY FOR NO GOOD REASON: YES, SOMETIMES
I HAVE FELT SAD OR MISERABLE: NO, NOT AT ALL
I HAVE BEEN ABLE TO LAUGH AND SEE THE FUNNY SIDE OF THINGS: AS MUCH AS I ALWAYS COULD
I HAVE BEEN SO UNHAPPY THAT I HAVE BEEN CRYING: NO, NEVER
I HAVE LOOKED FORWARD WITH ENJOYMENT TO THINGS: AS MUCH AS I EVER DID
I HAVE BEEN SO UNHAPPY THAT I HAVE HAD DIFFICULTY SLEEPING: NOT VERY OFTEN

## 2024-12-09 NOTE — PROGRESS NOTES
Patient presents for initial OB visit  Urine culture & GC/CT sent today.  LMP: 10/17/2024  Flu: Declines   EPDS = 8  C/O: nausea and dry heaving     Deana Quan MA II    29 y.o.  at 7.4 weeks gestational age by sure LMP.  Planned and desired pregnancy.  USN done today - CRL c/w LMP dating.  EDC per LMP - .  PMH complicated by anxiety/depression - well controlled on meds.  Discussed risks of SSRI/SNRI use during pregnancy.  Discussed risk of  Poor Adjustment Syndrome (with symptoms including but not limited to irritability, feeding difficulties, increased respiratory rate).  Reviewed that severe symptoms are rare and overall symptoms typically are self-limited and resolve within a week.  Reviewed that there is no data to suggest that discontinuing these medications in the third trimester decreases risk to the . Discussed risk of worsening maternal symptoms at the end of pregnancy or postpartum if medications are discontinued in the third trimester.  Also has hypoythyroidism - will check TSH next visit with routine prenatal labs. OB history significant for prior term VAVD after pushing for 4.5 hours.  Has nausea but tolerating.  Wants zofran prescription - sent today.  Taking OTC PNV.   Recommended Flu vaccine- pt declines.  Recommended COVID vaccine- pt declines. BMI today Body mass index is 41.53 kg/m². Discussed optimal weight gain in pregnancy.  Discussed genetic screening options and carrier screening options and she will think about these.  Start ASA next visit.  13 week USN ordered and she will call to schedule.  Pt aware of collaborative care model and group practice from last pregnancy.  Questions answered.

## 2024-12-10 PROBLEM — O99.210 OBESITY IN PREGNANCY (HHS-HCC): Status: ACTIVE | Noted: 2024-12-10

## 2024-12-10 PROBLEM — Z87.59 HISTORY OF VACUUM EXTRACTION ASSISTED DELIVERY: Status: ACTIVE | Noted: 2024-12-10

## 2024-12-10 LAB
BACTERIA UR CULT: NORMAL
C TRACH RRNA SPEC QL NAA+PROBE: NEGATIVE
N GONORRHOEA DNA SPEC QL PROBE+SIG AMP: NEGATIVE

## 2025-01-14 ENCOUNTER — APPOINTMENT (OUTPATIENT)
Dept: OBSTETRICS AND GYNECOLOGY | Facility: CLINIC | Age: 30
End: 2025-01-14
Payer: COMMERCIAL

## 2025-01-14 VITALS — SYSTOLIC BLOOD PRESSURE: 108 MMHG | BODY MASS INDEX: 41.46 KG/M2 | DIASTOLIC BLOOD PRESSURE: 74 MMHG | WEIGHT: 219.4 LBS

## 2025-01-14 DIAGNOSIS — Z3A.12 12 WEEKS GESTATION OF PREGNANCY (HHS-HCC): Primary | ICD-10-CM

## 2025-01-14 DIAGNOSIS — F41.1 GENERALIZED ANXIETY DISORDER: ICD-10-CM

## 2025-01-14 DIAGNOSIS — O26.891 RH NEGATIVE STATUS DURING PREGNANCY IN FIRST TRIMESTER (HHS-HCC): ICD-10-CM

## 2025-01-14 DIAGNOSIS — Z67.91 RH NEGATIVE STATUS DURING PREGNANCY IN FIRST TRIMESTER (HHS-HCC): ICD-10-CM

## 2025-01-14 DIAGNOSIS — O99.210 OBESITY IN PREGNANCY (HHS-HCC): ICD-10-CM

## 2025-01-14 DIAGNOSIS — Z34.90 PRENATAL CARE, ANTEPARTUM (HHS-HCC): ICD-10-CM

## 2025-01-14 DIAGNOSIS — Z87.59 HISTORY OF VACUUM EXTRACTION ASSISTED DELIVERY: ICD-10-CM

## 2025-01-14 DIAGNOSIS — E03.9 HYPOTHYROIDISM, UNSPECIFIED TYPE: ICD-10-CM

## 2025-01-14 PROCEDURE — 0501F PRENATAL FLOW SHEET: CPT | Performed by: OBSTETRICS & GYNECOLOGY

## 2025-01-14 RX ORDER — NAPROXEN SODIUM 220 MG/1
162 TABLET, FILM COATED ORAL DAILY
Qty: 60 TABLET | Refills: 11 | Status: SHIPPED | OUTPATIENT
Start: 2025-01-14 | End: 2026-01-14

## 2025-01-14 NOTE — PROGRESS NOTES
Patient presents for OBFU & physical   Will have OB labs drawn at  lab, declines genetic testing  Pap: 2024 nml  C/O: Still having nausea and vomiting - stopped taking Zofran for palpitations      Deana Quan MA II    Routine prenatal visit     Subjective    HPI:  Feeling well overall.  Still has some nausea but overall feels that it is getting better.  Not taking zofran - felt that it caused palpitations.  Declines alternative anti-emetic at this time.  Has 13 week USN tomorrow.  Routine labs/TSH ordered and she will get done at  lab this week. Discussed starting ASA and prescription sent.     Objective    Vital Signs  /74   Wt 99.5 kg (219 lb 6.4 oz)   LMP 10/17/2024   BMI 41.46 kg/m²     Yesy Orozco is a 29 y.o. yo  at 12w5d here for the following concerns which we addressed today:     Medical Problems       Problem List       Generalized anxiety disorder    Overview Signed 2024  9:21 AM by Jewels Spear MD     - well controlled on welbutrin and lexapro          Hypothyroidism    Overview Signed 2024  9:21 AM by Jewels Spear MD     - on synthroid   <> TSH q trimester          Rh negative, maternal ([facility])    Overview Signed 2024  9:21 AM by Jewels Spear MD     <> Rhogam at 28 weeks          12 weeks gestation of pregnancy ([facility])    Overview Addendum 2025  1:59 PM by Jewels Spear MD     Desired provider in labor: [] CNM  [x] Physician  [x] Blood Products: [x] Yes, accepts [] No, needs counseling  [x] Initial BMI: 39.70   [] Prenatal Labs: <> ordered and pt will get done at  lab   [x] Cervical Cancer Screening up to date- normal    [] Rh status: negative   [] Genetic Screening:  Declines NIPS/carrier screening   [] NT US: (11-13 wks): scheduled 1/15   [x] Baby ASA (if indicated): started    [x] Pregnancy dated by: LMP c/w in-office USN at 7 weeks     [] Anatomy US: (19-20 wks)  [] Federal Sterilization consent signed  (if indicated):  [] 1hr GCT at 24-28wks:  [] Rhogam (if indicated):   [] Fetal Surveillance (if indicated):  [] Tdap (27-32 wks, may be given up to 36 wks if initial window missed):   [] RSV (32-36 wks) (Sept. to end of Jan):   [x] Flu Vaccine: Declines   [x] Updated COVID vaccine recommended - declines     [] Breastfeeding:  [] Postpartum Birth control method:   [] GBS at 36 - 37 wks:  [] 39 weeks discussion of IOL vs. Expectant management:  [] Mode of delivery ( anticipated ):           History of vacuum extraction assisted delivery    Overview Signed 12/10/2024 10:31 AM by Jewels Spear MD     - pushed for 4.5 hours, then VAVD  - Baby weighed 7lbs, 9oz          Obesity in pregnancy ([facility])    Overview Addendum 1/14/2025  1:59 PM by Jewels Spear MD     - BMI 42 at first visit  <> weekly testing at 34 weeks   <> 30 week growth USN              Follow up in 4 week(s).

## 2025-01-15 ENCOUNTER — HOSPITAL ENCOUNTER (OUTPATIENT)
Dept: RADIOLOGY | Facility: CLINIC | Age: 30
Discharge: HOME | End: 2025-01-15
Payer: COMMERCIAL

## 2025-01-15 DIAGNOSIS — Z34.90 PRENATAL CARE, ANTEPARTUM (HHS-HCC): ICD-10-CM

## 2025-01-15 PROCEDURE — 76813 OB US NUCHAL MEAS 1 GEST: CPT | Performed by: STUDENT IN AN ORGANIZED HEALTH CARE EDUCATION/TRAINING PROGRAM

## 2025-01-15 PROCEDURE — 76801 OB US < 14 WKS SINGLE FETUS: CPT

## 2025-01-15 PROCEDURE — 76813 OB US NUCHAL MEAS 1 GEST: CPT

## 2025-02-11 ENCOUNTER — APPOINTMENT (OUTPATIENT)
Dept: OBSTETRICS AND GYNECOLOGY | Facility: CLINIC | Age: 30
End: 2025-02-11
Payer: COMMERCIAL

## 2025-02-11 VITALS — WEIGHT: 221.4 LBS | BODY MASS INDEX: 41.83 KG/M2 | DIASTOLIC BLOOD PRESSURE: 71 MMHG | SYSTOLIC BLOOD PRESSURE: 107 MMHG

## 2025-02-11 DIAGNOSIS — Z87.59 HISTORY OF VACUUM EXTRACTION ASSISTED DELIVERY: ICD-10-CM

## 2025-02-11 DIAGNOSIS — E03.9 HYPOTHYROIDISM, UNSPECIFIED TYPE: ICD-10-CM

## 2025-02-11 DIAGNOSIS — Z3A.16 16 WEEKS GESTATION OF PREGNANCY (HHS-HCC): Primary | ICD-10-CM

## 2025-02-11 DIAGNOSIS — O99.210 OBESITY IN PREGNANCY (HHS-HCC): ICD-10-CM

## 2025-02-11 DIAGNOSIS — F41.1 GENERALIZED ANXIETY DISORDER: ICD-10-CM

## 2025-02-11 DIAGNOSIS — Z34.90 PRENATAL CARE, ANTEPARTUM (HHS-HCC): ICD-10-CM

## 2025-02-11 LAB
ERYTHROCYTE [DISTWIDTH] IN BLOOD BY AUTOMATED COUNT: 16 % (ref 11.5–14.5)
HCT VFR BLD AUTO: 34.6 % (ref 36–46)
HGB BLD-MCNC: 10.8 G/DL (ref 12–16)
MCH RBC QN AUTO: 24.9 PG (ref 26–34)
MCHC RBC AUTO-ENTMCNC: 31.2 G/DL (ref 32–36)
MCV RBC AUTO: 80 FL (ref 80–100)
NRBC BLD-RTO: 0 /100 WBCS (ref 0–0)
PLATELET # BLD AUTO: 186 X10*3/UL (ref 150–450)
RBC # BLD AUTO: 4.33 X10*6/UL (ref 4–5.2)
WBC # BLD AUTO: 8.9 X10*3/UL (ref 4.4–11.3)

## 2025-02-11 PROCEDURE — 83550 IRON BINDING TEST: CPT

## 2025-02-11 PROCEDURE — 82607 VITAMIN B-12: CPT

## 2025-02-11 PROCEDURE — 86850 RBC ANTIBODY SCREEN: CPT

## 2025-02-11 PROCEDURE — 86901 BLOOD TYPING SEROLOGIC RH(D): CPT

## 2025-02-11 PROCEDURE — 86900 BLOOD TYPING SEROLOGIC ABO: CPT

## 2025-02-11 PROCEDURE — 82728 ASSAY OF FERRITIN: CPT

## 2025-02-11 PROCEDURE — 0501F PRENATAL FLOW SHEET: CPT | Performed by: OBSTETRICS & GYNECOLOGY

## 2025-02-11 PROCEDURE — 82746 ASSAY OF FOLIC ACID SERUM: CPT

## 2025-02-11 PROCEDURE — 85027 COMPLETE CBC AUTOMATED: CPT

## 2025-02-11 NOTE — PROGRESS NOTES
"Patient presents for OBFU  C/O: pain in thighs at night, \"pins & needles\"     Deana Quan MA II    Routine prenatal visit     Subjective    HPI:  Feeling well in general.  Has some numbness on her thighs at night when she's in her bed.  Also had this in her prior pregnancy.  Discussed support pillows, etc,  Has not done routine labs - sent from office today.  Taking ASA without issue.  Had 13 week USN which was WNL.  Has anatomy USN scheduled.     Objective    Vital Signs  /71   Wt 100 kg (221 lb 6.4 oz)   LMP 10/17/2024   BMI 41.83 kg/m²     Yesy Orozco is a 29 y.o. yo  at 16w5d here for the following concerns which we addressed today:     Medical Problems       Problem List       Generalized anxiety disorder    Overview Signed 2024  9:21 AM by Jewels Spear MD     - well controlled on welbutrin and lexapro          Hypothyroidism    Overview Signed 2024  9:21 AM by Jewels Spear MD     - on synthroid   <> TSH q trimester          Rh negative, maternal ([facility])    Overview Signed 2024  9:21 AM by Jewels Spear MD     <> Rhogam at 28 weeks          16 weeks gestation of pregnancy ([facility])    Overview Addendum 2025 10:35 AM by Jewels Spear MD     Desired provider in labor: [] CNM  [x] Physician  [x] Blood Products: [x] Yes, accepts [] No, needs counseling  [x] Initial BMI: 39.70   [] Prenatal Labs: <> done in clinic today    [x] Cervical Cancer Screening up to date- normal    [x] Rh status: negative   [x] Genetic Screening:  Declines NIPS/carrier screening   [] NT US: (11-13 wks): scheduled 1/15   [x] Baby ASA (if indicated): started    [x] Pregnancy dated by: LMP c/w in-office USN at 7 weeks     [] Anatomy US: (19-20 wks): scheduled   [] Federal Sterilization consent signed (if indicated):  [] 1hr GCT at 24-28wks:  [] Rhogam (if indicated):   [] Fetal Surveillance (if indicated):  [] Tdap (27-32 wks, may be given up to 36 wks if " initial window missed):   [x] Flu Vaccine: Declines   [x] Updated COVID vaccine recommended - declines     [] Breastfeeding:  [] Postpartum Birth control method:   [] GBS at 36 - 37 wks:  [] 39 weeks discussion of IOL vs. Expectant management:  [] Mode of delivery ( anticipated ):           History of vacuum extraction assisted delivery    Overview Signed 12/10/2024 10:31 AM by Jewels Spear MD     - pushed for 4.5 hours, then VAVD  - Baby weighed 7lbs, 9oz          Obesity in pregnancy ([facility])    Overview Addendum 1/14/2025  1:59 PM by Jewels Spear MD     - BMI 42 at first visit  <> weekly testing at 34 weeks   <> 30 week growth USN              Follow up in 4 week(s).

## 2025-02-12 LAB
ABO GROUP (TYPE) IN BLOOD: NORMAL
ANTIBODY SCREEN: NORMAL
FERRITIN SERPL-MCNC: 12 NG/ML
FOLATE SERPL-MCNC: 15.4 NG/ML
IRON SATN MFR SERPL: NORMAL %
IRON SERPL-MCNC: 50 UG/DL
REFLEX ADDED, ANEMIA PANEL: NORMAL
RH FACTOR (ANTIGEN D): NORMAL
TIBC SERPL-MCNC: NORMAL UG/DL
UIBC SERPL-MCNC: >450 UG/DL
VIT B12 SERPL-MCNC: 254 PG/ML

## 2025-02-14 DIAGNOSIS — E03.9 HYPOTHYROIDISM, UNSPECIFIED TYPE: ICD-10-CM

## 2025-02-15 LAB
HBA1C MFR BLD: NORMAL % OF TOTAL HGB
HBV SURFACE AG SERPL QL IA: NORMAL
HCV AB SERPL QL IA: NORMAL
HIV 1+2 AB+HIV1 P24 AG SERPL QL IA: NORMAL
RUBV IGG SERPL IA-ACNC: 1.72 INDEX
T PALLIDUM AB SER QL IA: NEGATIVE
TSH SERPL-ACNC: 4.48 MIU/L

## 2025-02-18 RX ORDER — LEVOTHYROXINE SODIUM 125 UG/1
125 TABLET ORAL DAILY
Qty: 30 TABLET | Refills: 11 | Status: SHIPPED | OUTPATIENT
Start: 2025-02-18 | End: 2026-02-18

## 2025-03-06 ENCOUNTER — HOSPITAL ENCOUNTER (OUTPATIENT)
Dept: RADIOLOGY | Facility: CLINIC | Age: 30
Discharge: HOME | End: 2025-03-06
Payer: COMMERCIAL

## 2025-03-06 DIAGNOSIS — E07.9 THYROID DISEASE DURING PREGNANCY IN THIRD TRIMESTER (HHS-HCC): ICD-10-CM

## 2025-03-06 DIAGNOSIS — E66.813 CLASS 3 OBESITY: ICD-10-CM

## 2025-03-06 DIAGNOSIS — O35.9XX0 MATERNAL CARE FOR (SUSPECTED) FETAL ABNORMALITY AND DAMAGE, UNSPECIFIED, NOT APPLICABLE OR UNSPECIFIED: ICD-10-CM

## 2025-03-06 DIAGNOSIS — O99.283 THYROID DISEASE DURING PREGNANCY IN THIRD TRIMESTER (HHS-HCC): ICD-10-CM

## 2025-03-06 DIAGNOSIS — Z34.90 PRENATAL CARE, ANTEPARTUM (HHS-HCC): ICD-10-CM

## 2025-03-06 PROCEDURE — 76811 OB US DETAILED SNGL FETUS: CPT

## 2025-03-11 ENCOUNTER — APPOINTMENT (OUTPATIENT)
Dept: OBSTETRICS AND GYNECOLOGY | Facility: CLINIC | Age: 30
End: 2025-03-11
Payer: COMMERCIAL

## 2025-04-11 ENCOUNTER — APPOINTMENT (OUTPATIENT)
Dept: OBSTETRICS AND GYNECOLOGY | Facility: CLINIC | Age: 30
End: 2025-04-11
Payer: COMMERCIAL

## 2025-04-11 VITALS — WEIGHT: 224 LBS | DIASTOLIC BLOOD PRESSURE: 67 MMHG | SYSTOLIC BLOOD PRESSURE: 96 MMHG | BODY MASS INDEX: 42.32 KG/M2

## 2025-04-11 DIAGNOSIS — O26.892 RH NEGATIVE STATUS DURING PREGNANCY IN SECOND TRIMESTER (HHS-HCC): ICD-10-CM

## 2025-04-11 DIAGNOSIS — Z34.92 PRENATAL CARE IN SECOND TRIMESTER, UNSPECIFIED GRAVIDITY: ICD-10-CM

## 2025-04-11 DIAGNOSIS — F41.1 GENERALIZED ANXIETY DISORDER: ICD-10-CM

## 2025-04-11 DIAGNOSIS — Z87.59 HISTORY OF VACUUM EXTRACTION ASSISTED DELIVERY: ICD-10-CM

## 2025-04-11 DIAGNOSIS — Z3A.25 25 WEEKS GESTATION OF PREGNANCY (HHS-HCC): Primary | ICD-10-CM

## 2025-04-11 DIAGNOSIS — O99.210 OBESITY IN PREGNANCY (HHS-HCC): ICD-10-CM

## 2025-04-11 DIAGNOSIS — Z13.1 SCREENING FOR DIABETES MELLITUS: ICD-10-CM

## 2025-04-11 DIAGNOSIS — Z67.91 RH NEGATIVE STATUS DURING PREGNANCY IN SECOND TRIMESTER (HHS-HCC): ICD-10-CM

## 2025-04-11 PROCEDURE — 0501F PRENATAL FLOW SHEET: CPT | Performed by: OBSTETRICS & GYNECOLOGY

## 2025-04-11 NOTE — PROGRESS NOTES
Routine prenatal visit     Subjective    HPI:  Feeling well overall - no questions or concerns today.  Missed last appt due to GI virus in her and her family.  Reviewed normal anatomy USN. Has 30 week growth USN scheduled.  Glucola/CBC/syphilis screen and repeat TSH ordered and she will do at lab prior to her next appt.  Rhogam and Tdap at next appt.     Objective    Vital Signs  BP 96/67   Wt 102 kg (224 lb)   LMP 10/17/2024   BMI 42.32 kg/m²     Yesy Orozco is a 29 y.o. yo  at 25w1d here for the following concerns which we addressed today:     Medical Problems       Problem List       Generalized anxiety disorder    Overview Signed 2024  9:21 AM by Jewels Spear MD     - well controlled on welbutrin and lexapro          Hypothyroidism    Overview Signed 2024  9:21 AM by Jewels Spear MD     - on synthroid   <> TSH q trimester          Rh negative, maternal ([facility])    Overview Signed 2024  9:21 AM by Jewels Spear MD     <> Rhogam at 28 weeks          25 weeks gestation of pregnancy ([facility])    Overview Addendum 2025 11:02 AM by Jewels Spear MD     Desired provider in labor: [] CNM  [x] Physician  [x] Blood Products: [x] Yes, accepts [] No, needs counseling  [x] Initial BMI: 39.70   [x] Prenatal Labs: UTD  [x] Cervical Cancer Screening up to date- normal    [x] Rh status: negative   [x] Genetic Screening:  Declines NIPS/carrier screening   [x] NT US: (11-13 wks): WNL  [x] Baby ASA (if indicated): started    [x] Pregnancy dated by: LMP c/w in-office USN at 7 weeks     [x] Anatomy US: (19-20 wks): WNL  [] Federal Sterilization consent signed (if indicated):  [] 1hr GCT at 24-28wks: <> order placed and she will do prior to next appt   [] Rhogam: <> next appt   [] Fetal Surveillance: <> NSTs at 34 weeks   [] Tdap: <> give at next appt   [x] Flu Vaccine: Declines   [x] Updated COVID vaccine recommended - declines     [] Breastfeeding:  []  Postpartum Birth control method:   [] GBS at 36 - 37 wks:  [] 39 weeks discussion of IOL vs. Expectant management:  [] Mode of delivery ( anticipated ):           History of vacuum extraction assisted delivery    Overview Signed 12/10/2024 10:31 AM by Jewels Spear MD     - pushed for 4.5 hours, then VAVD  - Baby weighed 7lbs, 9oz          Obesity in pregnancy ([facility])    Overview Addendum 1/14/2025  1:59 PM by Jewels Spear MD     - BMI 42 at first visit  <> weekly testing at 34 weeks   <> 30 week growth USN              Follow up in 3 week(s).

## 2025-04-16 PROCEDURE — 85027 COMPLETE CBC AUTOMATED: CPT

## 2025-04-17 ENCOUNTER — TELEPHONE (OUTPATIENT)
Dept: OBSTETRICS AND GYNECOLOGY | Facility: CLINIC | Age: 30
End: 2025-04-17

## 2025-04-17 ENCOUNTER — LAB (OUTPATIENT)
Dept: LAB | Facility: HOSPITAL | Age: 30
End: 2025-04-17
Payer: COMMERCIAL

## 2025-04-17 DIAGNOSIS — Z3A.25 25 WEEKS GESTATION OF PREGNANCY (HHS-HCC): ICD-10-CM

## 2025-04-17 DIAGNOSIS — Z13.1 ENCOUNTER FOR SCREENING FOR DIABETES MELLITUS: ICD-10-CM

## 2025-04-17 DIAGNOSIS — Z34.92 ENCOUNTER FOR SUPERVISION OF NORMAL PREGNANCY, UNSPECIFIED, SECOND TRIMESTER: Primary | ICD-10-CM

## 2025-04-17 LAB
ERYTHROCYTE [DISTWIDTH] IN BLOOD BY AUTOMATED COUNT: 17 % (ref 11.5–14.5)
HCT VFR BLD AUTO: 31.3 % (ref 36–46)
HGB BLD-MCNC: 9.4 G/DL (ref 12–16)
MCH RBC QN AUTO: 25.8 PG (ref 26–34)
MCHC RBC AUTO-ENTMCNC: 30 G/DL (ref 32–36)
MCV RBC AUTO: 86 FL (ref 80–100)
NRBC BLD-RTO: 0 /100 WBCS (ref 0–0)
PLATELET # BLD AUTO: 164 X10*3/UL (ref 150–450)
RBC # BLD AUTO: 3.64 X10*6/UL (ref 4–5.2)
REFLEX ADDED, ANEMIA PANEL: NORMAL
WBC # BLD AUTO: 8.3 X10*3/UL (ref 4.4–11.3)

## 2025-04-17 NOTE — TELEPHONE ENCOUNTER
Client services called.. entire anemia panel canceled , only rcvd the CBC tube, per Erika in client services   Please advise

## 2025-04-17 NOTE — TELEPHONE ENCOUNTER
Received a call from Sue with  client services  Stated Ferritin and iron was cancelled, was never received in the lab

## 2025-04-18 PROBLEM — O99.019 ANTEPARTUM ANEMIA: Status: ACTIVE | Noted: 2025-04-18

## 2025-04-19 LAB
GLUCOSE 1H P 50 G GLC PO SERPL-MCNC: 108 MG/DL
T PALLIDUM AB SER QL IA: NEGATIVE
TSH SERPL-ACNC: 3.3 MIU/L

## 2025-04-22 DIAGNOSIS — O99.019 ANTEPARTUM ANEMIA: ICD-10-CM

## 2025-04-28 ENCOUNTER — LAB (OUTPATIENT)
Dept: LAB | Facility: HOSPITAL | Age: 30
End: 2025-04-28
Payer: COMMERCIAL

## 2025-04-28 DIAGNOSIS — Z34.90 ENCOUNTER FOR SUPERVISION OF NORMAL PREGNANCY, UNSPECIFIED, UNSPECIFIED TRIMESTER: Primary | ICD-10-CM

## 2025-04-28 DIAGNOSIS — Z34.90 PRENATAL CARE, ANTEPARTUM: ICD-10-CM

## 2025-04-28 LAB
ABO GROUP (TYPE) IN BLOOD: NORMAL
ANTIBODY SCREEN: NORMAL
ERYTHROCYTE [DISTWIDTH] IN BLOOD BY AUTOMATED COUNT: 16.6 % (ref 11.5–14.5)
HCT VFR BLD AUTO: 30.4 % (ref 36–46)
HGB BLD-MCNC: 9.3 G/DL (ref 12–16)
MCH RBC QN AUTO: 25 PG (ref 26–34)
MCHC RBC AUTO-ENTMCNC: 30.6 G/DL (ref 32–36)
MCV RBC AUTO: 82 FL (ref 80–100)
NRBC BLD-RTO: 0 /100 WBCS (ref 0–0)
PLATELET # BLD AUTO: 150 X10*3/UL (ref 150–450)
RBC # BLD AUTO: 3.72 X10*6/UL (ref 4–5.2)
REFLEX ADDED, ANEMIA PANEL: NORMAL
RH FACTOR (ANTIGEN D): NORMAL
WBC # BLD AUTO: 8 X10*3/UL (ref 4.4–11.3)

## 2025-04-28 PROCEDURE — 86901 BLOOD TYPING SEROLOGIC RH(D): CPT

## 2025-04-28 PROCEDURE — 86850 RBC ANTIBODY SCREEN: CPT

## 2025-04-28 PROCEDURE — 86900 BLOOD TYPING SEROLOGIC ABO: CPT

## 2025-04-28 PROCEDURE — 36415 COLL VENOUS BLD VENIPUNCTURE: CPT

## 2025-04-28 PROCEDURE — 85027 COMPLETE CBC AUTOMATED: CPT

## 2025-04-29 ENCOUNTER — APPOINTMENT (OUTPATIENT)
Dept: OBSTETRICS AND GYNECOLOGY | Facility: CLINIC | Age: 30
End: 2025-04-29
Payer: COMMERCIAL

## 2025-04-29 VITALS — DIASTOLIC BLOOD PRESSURE: 60 MMHG | BODY MASS INDEX: 42.89 KG/M2 | SYSTOLIC BLOOD PRESSURE: 106 MMHG | WEIGHT: 227 LBS

## 2025-04-29 DIAGNOSIS — Z87.59 HISTORY OF VACUUM EXTRACTION ASSISTED DELIVERY: ICD-10-CM

## 2025-04-29 DIAGNOSIS — O99.019 ANTEPARTUM ANEMIA: ICD-10-CM

## 2025-04-29 DIAGNOSIS — O26.899 RH NEGATIVE, ANTEPARTUM (HHS-HCC): ICD-10-CM

## 2025-04-29 DIAGNOSIS — Z67.91 RH NEGATIVE, ANTEPARTUM (HHS-HCC): ICD-10-CM

## 2025-04-29 DIAGNOSIS — Z3A.27 27 WEEKS GESTATION OF PREGNANCY (HHS-HCC): Primary | ICD-10-CM

## 2025-04-29 DIAGNOSIS — O99.012 ANEMIA DURING PREGNANCY IN SECOND TRIMESTER: ICD-10-CM

## 2025-04-29 DIAGNOSIS — O99.210 OBESITY IN PREGNANCY (HHS-HCC): ICD-10-CM

## 2025-04-29 PROCEDURE — 0501F PRENATAL FLOW SHEET: CPT | Performed by: OBSTETRICS & GYNECOLOGY

## 2025-04-29 PROCEDURE — 96372 THER/PROPH/DIAG INJ SC/IM: CPT | Performed by: OBSTETRICS & GYNECOLOGY

## 2025-04-29 RX ORDER — FERROUS SULFATE 137(45) MG
1 TABLET, EXTENDED RELEASE ORAL DAILY
Qty: 30 TABLET | Refills: 11 | Status: SHIPPED | OUTPATIENT
Start: 2025-04-29 | End: 2026-04-29

## 2025-04-29 ASSESSMENT — EDINBURGH POSTNATAL DEPRESSION SCALE (EPDS)
I HAVE BLAMED MYSELF UNNECESSARILY WHEN THINGS WENT WRONG: NOT VERY OFTEN
I HAVE LOOKED FORWARD WITH ENJOYMENT TO THINGS: AS MUCH AS I EVER DID
I HAVE BEEN SO UNHAPPY THAT I HAVE HAD DIFFICULTY SLEEPING: NOT AT ALL
I HAVE BEEN SO UNHAPPY THAT I HAVE BEEN CRYING: NO, NEVER
I HAVE FELT SAD OR MISERABLE: NO, NOT AT ALL
I HAVE BEEN ANXIOUS OR WORRIED FOR NO GOOD REASON: YES, SOMETIMES
I HAVE BEEN ABLE TO LAUGH AND SEE THE FUNNY SIDE OF THINGS: AS MUCH AS I ALWAYS COULD
I HAVE FELT SCARED OR PANICKY FOR NO GOOD REASON: YES, SOMETIMES
THE THOUGHT OF HARMING MYSELF HAS OCCURRED TO ME: NEVER
TOTAL SCORE: 6
THINGS HAVE BEEN GETTING ON TOP OF ME: NO, MOST OF THE TIME I HAVE COPED QUITE WELL

## 2025-04-29 NOTE — PROGRESS NOTES
Rhogam today, declines TDAP    Routine prenatal visit     Subjective    HPI:  Pt doing well- no complaints. Repeat hemoglobin 9.3 from 9.4  Pt states she has not been taking oral iron due to GI upset.  Also does not want to do IV iron.  Will try Slow-FE - prescription sent.  Recommended Tdap - pt declines. Rhogam given today.     Objective    Vital Signs  /60   Wt 103 kg (227 lb)   LMP 10/17/2024   BMI 42.89 kg/m²     Yesy Orozco is a 29 y.o. yo  at 27w5d here for the following concerns which we addressed today:     Medical Problems       Problem List       Generalized anxiety disorder    Overview Signed 2024  9:21 AM by Jewels Spear MD   - well controlled on welbutrin and lexapro          Hypothyroidism    Overview Signed 2024  9:21 AM by Jewels Spear MD   - on synthroid   <> TSH q trimester          Rh negative, maternal ([facility])    Overview Addendum 2025 10:53 AM by Jewels Spear MD   - Rhogam given           27 weeks gestation of pregnancy ([facility])    Overview Addendum 2025 10:52 AM by Jewels Spear MD   Desired provider in labor: [] CNM  [x] Physician  [x] Blood Products: [x] Yes, accepts [] No, needs counseling  [x] Initial BMI: 39.70   [x] Prenatal Labs: UTD  [x] Cervical Cancer Screening up to date- normal    [x] Rh status: negative   [x] Genetic Screening:  Declines NIPS/carrier screening   [x] NT US: (11-13 wks): WNL  [x] Baby ASA (if indicated): started    [x] Pregnancy dated by: LMP c/w in-office USN at 7 weeks     [x] Anatomy US: (19-20 wks): WNL  [] Federal Sterilization consent signed (if indicated):  [x] 1hr GCT at 24-28wks: WNL  [x] Rhogam: <given    [] Fetal Surveillance: <> NSTs at 34 weeks   [x] Tdap: Declines   [x] Flu Vaccine: Declines   [x] Updated COVID vaccine recommended - declines     [x] Breastfeeding: Plans to breast feed, has pump   [] Postpartum Birth control method:   [] GBS at 36 - 37 wks:  []  39 weeks discussion of IOL vs. Expectant management:  [] Mode of delivery ( anticipated ):           History of vacuum extraction assisted delivery    Overview Signed 12/10/2024 10:31 AM by Jewels Spear MD   - pushed for 4.5 hours, then VAVD  - Baby weighed 7lbs, 9oz          Obesity in pregnancy ([facility])    Overview Addendum 1/14/2025  1:59 PM by Jewels Spear MD   - BMI 42 at first visit  <> weekly testing at 34 weeks   <> 30 week growth USN          Antepartum anemia    Overview Addendum 4/29/2025 10:52 AM by Jewels Spear MD   - hemoglobin 9.4 with glucola  - repeat hemoglobin 9.3 - pt has not been taking oral iron due to GI upset - prescription for Slow-FE sent 4/29 - recheck labs in a few weeks              Follow up in 2 week(s).

## 2025-05-12 ENCOUNTER — APPOINTMENT (OUTPATIENT)
Dept: OBSTETRICS AND GYNECOLOGY | Facility: CLINIC | Age: 30
End: 2025-05-12
Payer: COMMERCIAL

## 2025-05-21 ENCOUNTER — HOSPITAL ENCOUNTER (OUTPATIENT)
Dept: RADIOLOGY | Facility: CLINIC | Age: 30
Discharge: HOME | End: 2025-05-21
Payer: COMMERCIAL

## 2025-05-21 DIAGNOSIS — Z34.90 PRENATAL CARE, ANTEPARTUM: ICD-10-CM

## 2025-05-21 PROCEDURE — 76816 OB US FOLLOW-UP PER FETUS: CPT

## 2025-05-21 PROCEDURE — 76819 FETAL BIOPHYS PROFIL W/O NST: CPT

## 2025-05-27 ENCOUNTER — APPOINTMENT (OUTPATIENT)
Dept: OBSTETRICS AND GYNECOLOGY | Facility: CLINIC | Age: 30
End: 2025-05-27
Payer: COMMERCIAL

## 2025-05-27 VITALS — DIASTOLIC BLOOD PRESSURE: 67 MMHG | WEIGHT: 229.6 LBS | SYSTOLIC BLOOD PRESSURE: 100 MMHG | BODY MASS INDEX: 43.38 KG/M2

## 2025-05-27 DIAGNOSIS — O26.893 RH NEGATIVE STATUS DURING PREGNANCY IN THIRD TRIMESTER (HHS-HCC): ICD-10-CM

## 2025-05-27 DIAGNOSIS — Z67.91 RH NEGATIVE STATUS DURING PREGNANCY IN THIRD TRIMESTER (HHS-HCC): ICD-10-CM

## 2025-05-27 DIAGNOSIS — Z3A.31 31 WEEKS GESTATION OF PREGNANCY (HHS-HCC): Primary | ICD-10-CM

## 2025-05-27 DIAGNOSIS — O99.210 OBESITY IN PREGNANCY (HHS-HCC): ICD-10-CM

## 2025-05-27 DIAGNOSIS — E03.9 HYPOTHYROIDISM, UNSPECIFIED TYPE: ICD-10-CM

## 2025-05-27 PROCEDURE — 0501F PRENATAL FLOW SHEET: CPT | Performed by: OBSTETRICS & GYNECOLOGY

## 2025-05-27 NOTE — PROGRESS NOTES
Patient presents for OBFU  No complaints or concerns at this time.    Routine prenatal visit     Subjective    HPI:  Routine OB follow up. No complaints today.  Has been taking SlowFE without GI issues.  Unable to stay to get repeat anemia labs today but will get them done next visit.  Discussed 30 week growth USN.     Objective    Vital Signs  /67   Wt 104 kg (229 lb 9.6 oz)   LMP 10/17/2024   BMI 43.38 kg/m²     Yesy Orozco is a 29 y.o. yo  at 31w5d here for the following concerns which we addressed today:     Medical Problems       Problem List       Generalized anxiety disorder    Overview Signed 2024  9:21 AM by Jewels Spear MD   - well controlled on welbutrin and lexapro          Hypothyroidism    Overview Addendum 2025  9:30 AM by Jewels Spear MD   - on synthroid   - normal TSH 3rd trimester          Rh negative, maternal ([facility])    Overview Addendum 2025 10:53 AM by Jewels Spear MD   - Rhogam given           31 weeks gestation of pregnancy ([facility])    Overview Addendum 2025 10:52 AM by Jewels Spear MD   Desired provider in labor: [] CNM  [x] Physician  [x] Blood Products: [x] Yes, accepts [] No, needs counseling  [x] Initial BMI: 39.70   [x] Prenatal Labs: UTD  [x] Cervical Cancer Screening up to date- normal    [x] Rh status: negative   [x] Genetic Screening:  Declines NIPS/carrier screening   [x] NT US: (11-13 wks): WNL  [x] Baby ASA (if indicated): started    [x] Pregnancy dated by: LMP c/w in-office USN at 7 weeks     [x] Anatomy US: (19-20 wks): WNL  [] Federal Sterilization consent signed (if indicated):  [x] 1hr GCT at 24-28wks: WNL  [x] Rhogam: <given    [] Fetal Surveillance: <> NSTs at 34 weeks   [x] Tdap: Declines   [x] Flu Vaccine: Declines   [x] Updated COVID vaccine recommended - declines     [x] Breastfeeding: Plans to breast feed, has pump   [] Postpartum Birth control method:   [] GBS at 36 - 37  wks:  [] 39 weeks discussion of IOL vs. Expectant management:  [] Mode of delivery ( anticipated ):           History of vacuum extraction assisted delivery    Overview Signed 12/10/2024 10:31 AM by Jewels Spear MD   - pushed for 4.5 hours, then VAVD  - Baby weighed 7lbs, 9oz          Obesity in pregnancy ([facility])    Overview Addendum 5/27/2025  9:27 AM by Jewels Spear MD   - BMI 42 at first visit  - 30 week growth - EFW 74%  <> weekly testing at 34 weeks   <> 36 week growth - pt may not want to get            Antepartum anemia    Overview Addendum 4/29/2025 10:52 AM by Jewels Spear MD   - hemoglobin 9.4 with glucola  - repeat hemoglobin 9.3 - pt has not been taking oral iron due to GI upset - prescription for Slow-FE sent 4/29 - recheck labs in a few weeks              Follow up in 2 week(s).

## 2025-06-09 ENCOUNTER — APPOINTMENT (OUTPATIENT)
Dept: OBSTETRICS AND GYNECOLOGY | Facility: CLINIC | Age: 30
End: 2025-06-09
Payer: COMMERCIAL

## 2025-06-09 VITALS — WEIGHT: 229 LBS | DIASTOLIC BLOOD PRESSURE: 68 MMHG | SYSTOLIC BLOOD PRESSURE: 101 MMHG | BODY MASS INDEX: 43.27 KG/M2

## 2025-06-09 DIAGNOSIS — O99.019 ANTEPARTUM ANEMIA: ICD-10-CM

## 2025-06-09 DIAGNOSIS — Z34.83 PRENATAL CARE, SUBSEQUENT PREGNANCY IN THIRD TRIMESTER: ICD-10-CM

## 2025-06-09 DIAGNOSIS — O26.893 RH NEGATIVE STATUS DURING PREGNANCY IN THIRD TRIMESTER (HHS-HCC): ICD-10-CM

## 2025-06-09 DIAGNOSIS — O99.210 OBESITY IN PREGNANCY (HHS-HCC): ICD-10-CM

## 2025-06-09 DIAGNOSIS — Z3A.33 33 WEEKS GESTATION OF PREGNANCY (HHS-HCC): Primary | ICD-10-CM

## 2025-06-09 DIAGNOSIS — Z67.91 RH NEGATIVE STATUS DURING PREGNANCY IN THIRD TRIMESTER (HHS-HCC): ICD-10-CM

## 2025-06-09 PROCEDURE — 0501F PRENATAL FLOW SHEET: CPT | Performed by: OBSTETRICS & GYNECOLOGY

## 2025-06-09 RX ORDER — UREA 10 %
1 LOTION (ML) TOPICAL
COMMUNITY
Start: 2025-05-28

## 2025-06-09 NOTE — PROGRESS NOTES
Routine prenatal visit     Subjective    HPI:  No complaints today.  Having more mild discomforts of third trimester pregnancy but coping well overall.  Repeat anemia labs ordered and she will get done at  lab. Start NSTs weekly next week.     Objective    Vital Signs  /68   Wt 104 kg (229 lb)   LMP 10/17/2024   BMI 43.27 kg/m²     Yesy Orozco is a 29 y.o. yo  at 33w4d here for the following concerns which we addressed today:     Medical Problems       Problem List       Generalized anxiety disorder    Overview Signed 2024  9:21 AM by Jewels Spear MD   - well controlled on welbutrin and lexapro          Hypothyroidism    Overview Addendum 2025  9:30 AM by Jewels Spear MD   - on synthroid   - normal TSH 3rd trimester          Rh negative, maternal ([facility])    Overview Addendum 2025 10:53 AM by Jewels Spear MD   - Rhogam given           33 weeks gestation of pregnancy ([facility])    Overview Addendum 2025 10:52 AM by Jewels Spear MD   Desired provider in labor: [] CNM  [x] Physician  [x] Blood Products: [x] Yes, accepts [] No, needs counseling  [x] Initial BMI: 39.70   [x] Prenatal Labs: UTD  [x] Cervical Cancer Screening up to date- normal    [x] Rh status: negative   [x] Genetic Screening:  Declines NIPS/carrier screening   [x] NT US: (11-13 wks): WNL  [x] Baby ASA (if indicated): started    [x] Pregnancy dated by: LMP c/w in-office USN at 7 weeks     [x] Anatomy US: (19-20 wks): WNL  [] Federal Sterilization consent signed (if indicated):  [x] 1hr GCT at 24-28wks: WNL  [x] Rhogam: <given    [] Fetal Surveillance: <> NSTs at 34 weeks   [x] Tdap: Declines   [x] Flu Vaccine: Declines   [x] Updated COVID vaccine recommended - declines     [x] Breastfeeding: Plans to breast feed, has pump   [] Postpartum Birth control method:   [] GBS at 36 - 37 wks:  [] 39 weeks discussion of IOL vs. Expectant management:  [] Mode of delivery (  anticipated ):           History of vacuum extraction assisted delivery    Overview Signed 12/10/2024 10:31 AM by Jewels Spear MD   - pushed for 4.5 hours, then VAVD  - Baby weighed 7lbs, 9oz          Obesity in pregnancy ([facility])    Overview Addendum 5/27/2025  9:27 AM by Jewels Spear MD   - BMI 42 at first visit  - 30 week growth - EFW 74%  <> weekly testing at 34 weeks   <> 36 week growth - pt may not want to get            Antepartum anemia    Overview Addendum 6/9/2025 11:29 AM by Jewels Spear MD   - hemoglobin 9.4 with glucola  - repeat hemoglobin 9.3 - pt started on slow-Fe and is tolerating it better    <> repeat anemia labs sent today 6/9              Follow up in 1 week(s).

## 2025-06-16 ENCOUNTER — APPOINTMENT (OUTPATIENT)
Dept: OBSTETRICS AND GYNECOLOGY | Facility: CLINIC | Age: 30
End: 2025-06-16
Payer: COMMERCIAL

## 2025-06-16 VITALS — WEIGHT: 230 LBS | SYSTOLIC BLOOD PRESSURE: 97 MMHG | DIASTOLIC BLOOD PRESSURE: 61 MMHG | BODY MASS INDEX: 43.46 KG/M2

## 2025-06-16 DIAGNOSIS — Z3A.34 34 WEEKS GESTATION OF PREGNANCY (HHS-HCC): Primary | ICD-10-CM

## 2025-06-16 DIAGNOSIS — F41.1 GENERALIZED ANXIETY DISORDER: ICD-10-CM

## 2025-06-16 DIAGNOSIS — O99.019 ANTEPARTUM ANEMIA: ICD-10-CM

## 2025-06-16 DIAGNOSIS — F32.A DEPRESSION, UNSPECIFIED DEPRESSION TYPE: ICD-10-CM

## 2025-06-16 DIAGNOSIS — O99.210 OBESITY IN PREGNANCY (HHS-HCC): ICD-10-CM

## 2025-06-16 DIAGNOSIS — O26.893 RH NEGATIVE STATUS DURING PREGNANCY IN THIRD TRIMESTER (HHS-HCC): ICD-10-CM

## 2025-06-16 DIAGNOSIS — Z67.91 RH NEGATIVE STATUS DURING PREGNANCY IN THIRD TRIMESTER (HHS-HCC): ICD-10-CM

## 2025-06-16 PROCEDURE — 83550 IRON BINDING TEST: CPT

## 2025-06-16 PROCEDURE — 82607 VITAMIN B-12: CPT

## 2025-06-16 PROCEDURE — 0501F PRENATAL FLOW SHEET: CPT | Performed by: OBSTETRICS & GYNECOLOGY

## 2025-06-16 PROCEDURE — 82728 ASSAY OF FERRITIN: CPT

## 2025-06-16 PROCEDURE — 59025 FETAL NON-STRESS TEST: CPT | Performed by: OBSTETRICS & GYNECOLOGY

## 2025-06-16 PROCEDURE — 82746 ASSAY OF FOLIC ACID SERUM: CPT

## 2025-06-16 PROCEDURE — 85027 COMPLETE CBC AUTOMATED: CPT

## 2025-06-16 RX ORDER — BUPROPION HYDROCHLORIDE 150 MG/1
150 TABLET ORAL DAILY
Qty: 90 TABLET | Refills: 3 | Status: SHIPPED | OUTPATIENT
Start: 2025-06-16

## 2025-06-16 RX ORDER — ESCITALOPRAM OXALATE 10 MG/1
10 TABLET ORAL DAILY
Qty: 90 TABLET | Refills: 3 | Status: SHIPPED | OUTPATIENT
Start: 2025-06-16

## 2025-06-16 NOTE — PROCEDURES
ani Trevino  at 34w4d with an KORINA of 2025, by Last Menstrual Period, was seen at [facility] for a nonstress test.    Non-Stress Test   Baseline Fetal Heart Rate for Non-Stress Test: 140 BPM  Variability in Waveform for Non-Stress Test: Moderate  Accelerations in Non-Stress Test: Yes  Decelerations in Non-Stress Test: None  Contractions in Non-Stress Test: Not present  Acoustic Stimulator for Non-Stress Test: No  Interpretation of Non-Stress Test   Interpretation of Non-Stress Test: Reactive                                    ani Trevino  at 34w4d with an KORINA of 2025, by Last Menstrual Period, was seen at [facility] for a nonstress test.    Non-Stress Test   Baseline Fetal Heart Rate for Non-Stress Test: 140 BPM  Variability in Waveform for Non-Stress Test: Moderate  Accelerations in Non-Stress Test: Yes  Decelerations in Non-Stress Test: None  Contractions in Non-Stress Test: Not present  Acoustic Stimulator for Non-Stress Test: No  Interpretation of Non-Stress Test   Interpretation of Non-Stress Test: Reactive

## 2025-06-16 NOTE — PROGRESS NOTES
NST started @ 11:06pm    Routine prenatal visit     Subjective    HPI:  Pt tearful today.  She is very anxious about getting blood work and did not get last week.  Willing to get drawn in office today- ultimately drawn without incident.  NST today reactive.  Does not want to get 36 week growth.   Will do fundal height next week and discuss.     Objective    Vital Signs  BP 97/61   Wt 104 kg (230 lb)   LMP 10/17/2024   BMI 43.46 kg/m²     Yesy Orozco is a 29 y.o. yo  at 34w4d here for the following concerns which we addressed today:     Medical Problems       Problem List       Generalized anxiety disorder    Overview Signed 2024  9:21 AM by Jewels Spear MD   - well controlled on welbutrin and lexapro          Hypothyroidism    Overview Addendum 2025  9:30 AM by Jewels Spear MD   - on synthroid   - normal TSH 3rd trimester          Rh negative, maternal ([facility])    Overview Addendum 2025 10:53 AM by Jewels Spear MD   - Rhogam given           34 weeks gestation of pregnancy ([facility])    Overview Addendum 2025 10:52 AM by Jewels Spear MD   Desired provider in labor: [] CNM  [x] Physician  [x] Blood Products: [x] Yes, accepts [] No, needs counseling  [x] Initial BMI: 39.70   [x] Prenatal Labs: UTD  [x] Cervical Cancer Screening up to date- normal    [x] Rh status: negative   [x] Genetic Screening:  Declines NIPS/carrier screening   [x] NT US: (11-13 wks): WNL  [x] Baby ASA (if indicated): started    [x] Pregnancy dated by: LMP c/w in-office USN at 7 weeks     [x] Anatomy US: (19-20 wks): WNL  [] Federal Sterilization consent signed (if indicated):  [x] 1hr GCT at 24-28wks: WNL  [x] Rhogam: <given    [] Fetal Surveillance: <> NSTs at 34 weeks   [x] Tdap: Declines   [x] Flu Vaccine: Declines   [x] Updated COVID vaccine recommended - declines     [x] Breastfeeding: Plans to breast feed, has pump   [] Postpartum Birth control method:   []  GBS at 36 - 37 wks:  [] 39 weeks discussion of IOL vs. Expectant management:  [] Mode of delivery ( anticipated ):           History of vacuum extraction assisted delivery    Overview Signed 12/10/2024 10:31 AM by Jewels Spear MD   - pushed for 4.5 hours, then VAVD  - Baby weighed 7lbs, 9oz          Obesity in pregnancy ([facility])    Overview Addendum 6/16/2025  1:13 PM by Jewels Spear MD   - BMI 42 at first visit  - 30 week growth - EFW 74%  <> weekly testing at 34 weeks   - 36 week growth - pt declines            Antepartum anemia    Overview Addendum 6/16/2025  1:13 PM by Jewels Spear MD   - hemoglobin 9.4 with glucola  - repeat hemoglobin 9.3 - pt started on slow-Fe and is tolerating it better    <> repeat anemia labs sent today 6/16              Follow up in 1 week(s).

## 2025-06-17 LAB
ERYTHROCYTE [DISTWIDTH] IN BLOOD BY AUTOMATED COUNT: 17.6 % (ref 11.5–14.5)
FERRITIN SERPL-MCNC: 16 NG/ML
FERRITIN SERPL-MCNC: 19 NG/ML
FOLATE SERPL-MCNC: 15.4 NG/ML
HCT VFR BLD AUTO: 33.9 % (ref 36–46)
HGB BLD-MCNC: 10.1 G/DL (ref 12–16)
IRON SATN MFR SERPL: 17 %
IRON SERPL-MCNC: 88 UG/DL
MCH RBC QN AUTO: 26.9 PG (ref 26–34)
MCHC RBC AUTO-ENTMCNC: 29.8 G/DL (ref 32–36)
MCV RBC AUTO: 90 FL (ref 80–100)
NRBC BLD-RTO: 0 /100 WBCS (ref 0–0)
PLATELET # BLD AUTO: 115 X10*3/UL (ref 150–450)
RBC # BLD AUTO: 3.75 X10*6/UL (ref 4–5.2)
REFLEX ADDED, ANEMIA PANEL: NORMAL
RETICS #: ABNORMAL CELLS/UL (ref 20000–80000)
RETICS/RBC NFR AUTO: 2.3 %
TIBC SERPL-MCNC: 523 UG/DL
UIBC SERPL-MCNC: 435 UG/DL
VIT B12 SERPL-MCNC: 195 PG/ML
WBC # BLD AUTO: 7.1 X10*3/UL (ref 4.4–11.3)

## 2025-06-18 PROBLEM — D69.6 THROMBOCYTOPENIA: Status: ACTIVE | Noted: 2025-06-18

## 2025-06-24 ENCOUNTER — APPOINTMENT (OUTPATIENT)
Dept: OBSTETRICS AND GYNECOLOGY | Facility: CLINIC | Age: 30
End: 2025-06-24
Payer: COMMERCIAL

## 2025-06-24 VITALS — BODY MASS INDEX: 43.34 KG/M2 | DIASTOLIC BLOOD PRESSURE: 62 MMHG | SYSTOLIC BLOOD PRESSURE: 106 MMHG | WEIGHT: 229.4 LBS

## 2025-06-24 DIAGNOSIS — Z67.91 RH NEGATIVE STATUS DURING PREGNANCY IN THIRD TRIMESTER (HHS-HCC): ICD-10-CM

## 2025-06-24 DIAGNOSIS — Z87.59 HISTORY OF VACUUM EXTRACTION ASSISTED DELIVERY: ICD-10-CM

## 2025-06-24 DIAGNOSIS — O26.893 RH NEGATIVE STATUS DURING PREGNANCY IN THIRD TRIMESTER (HHS-HCC): ICD-10-CM

## 2025-06-24 DIAGNOSIS — D69.6 THROMBOCYTOPENIA: ICD-10-CM

## 2025-06-24 DIAGNOSIS — O99.019 ANTEPARTUM ANEMIA: ICD-10-CM

## 2025-06-24 DIAGNOSIS — E03.9 HYPOTHYROIDISM, UNSPECIFIED TYPE: ICD-10-CM

## 2025-06-24 DIAGNOSIS — Z3A.35 35 WEEKS GESTATION OF PREGNANCY (HHS-HCC): Primary | ICD-10-CM

## 2025-06-24 DIAGNOSIS — O99.210 OBESITY IN PREGNANCY (HHS-HCC): ICD-10-CM

## 2025-06-24 PROCEDURE — 0501F PRENATAL FLOW SHEET: CPT | Performed by: OBSTETRICS & GYNECOLOGY

## 2025-06-24 PROCEDURE — 59025 FETAL NON-STRESS TEST: CPT | Performed by: OBSTETRICS & GYNECOLOGY

## 2025-06-24 NOTE — PROGRESS NOTES
Routine prenatal visit     Subjective    HPI:  Pt doing well - no issues.  NST reactive.  Normal fundal height - pt declines 36 week growth USN.  Discussed IOL by 39.6 - pt hoping to avoid IOL but is agreeable to this.  Will work on scheduling next week.  GBS next visit.     Objective    Vital Signs  /62   Wt 104 kg (229 lb 6.4 oz)   LMP 10/17/2024   BMI 43.34 kg/m²     Yesy Orozco is a 29 y.o. yo  at 35w5d here for the following concerns which we addressed today:     Medical Problems       Problem List       Generalized anxiety disorder    Overview Signed 2024  9:21 AM by Jewels Spear MD   - well controlled on welbutrin and lexapro          Hypothyroidism    Overview Addendum 2025  9:30 AM by Jewels Spear MD   - on synthroid   - normal TSH 3rd trimester          Rh negative, maternal ([facility])    Overview Addendum 2025 10:53 AM by Jewels Spear MD   - Rhogam given           35 weeks gestation of pregnancy ([facility])    Overview Addendum 2025 10:52 AM by Jewels Spear MD   Desired provider in labor: [] CNM  [x] Physician  [x] Blood Products: [x] Yes, accepts [] No, needs counseling  [x] Initial BMI: 39.70   [x] Prenatal Labs: UTD  [x] Cervical Cancer Screening up to date- normal    [x] Rh status: negative   [x] Genetic Screening:  Declines NIPS/carrier screening   [x] NT US: (11-13 wks): WNL  [x] Baby ASA (if indicated): started    [x] Pregnancy dated by: LMP c/w in-office USN at 7 weeks     [x] Anatomy US: (19-20 wks): WNL  [] Federal Sterilization consent signed (if indicated):  [x] 1hr GCT at 24-28wks: WNL  [x] Rhogam: <given    [] Fetal Surveillance: <> NSTs at 34 weeks   [x] Tdap: Declines   [x] Flu Vaccine: Declines   [x] Updated COVID vaccine recommended - declines     [x] Breastfeeding: Plans to breast feed, has pump   [] Postpartum Birth control method:   [] GBS at 36 - 37 wks:  [] 39 weeks discussion of IOL vs. Expectant  management:  [] Mode of delivery ( anticipated ):           History of vacuum extraction assisted delivery    Overview Signed 12/10/2024 10:31 AM by Jewels Spear MD   - pushed for 4.5 hours, then VAVD  - Baby weighed 7lbs, 9oz          Obesity in pregnancy ([facility])    Overview Addendum 6/16/2025  1:13 PM by Jewels Spear MD   - BMI 42 at first visit  - 30 week growth - EFW 74%  <> weekly testing at 34 weeks   - 36 week growth - pt declines            Antepartum anemia    Overview Addendum 6/18/2025 10:03 AM by Jewels Spear MD   - hemoglobin 9.4 with glucola  - repeat hemoglobin 9.3 - pt started on slow-Fe and is tolerating it better --> improved to 10.1            Thrombocytopenia    Overview Signed 6/18/2025 10:03 AM by Jewels Spear MD   - Plt 114 at 34 weeks   - Likely gestational thrombocytopenia             Follow up in 1 week(s).

## 2025-06-24 NOTE — PROCEDURES
Yesy Orozco, a  at 35w5d with an KORINA of 2025, by Last Menstrual Period, was seen at [facility] for a nonstress test.    Non-Stress Test   Baseline Fetal Heart Rate for Non-Stress Test: 120 BPM  Variability in Waveform for Non-Stress Test: Moderate  Accelerations in Non-Stress Test: Yes  Decelerations in Non-Stress Test: None  Contractions in Non-Stress Test: Not present  Acoustic Stimulator for Non-Stress Test: No  Interpretation of Non-Stress Test   Interpretation of Non-Stress Test: Reactive

## 2025-06-26 ENCOUNTER — APPOINTMENT (OUTPATIENT)
Dept: RADIOLOGY | Facility: CLINIC | Age: 30
End: 2025-06-26
Payer: COMMERCIAL

## 2025-07-01 ENCOUNTER — APPOINTMENT (OUTPATIENT)
Facility: CLINIC | Age: 30
End: 2025-07-01
Payer: COMMERCIAL

## 2025-07-01 VITALS
DIASTOLIC BLOOD PRESSURE: 70 MMHG | WEIGHT: 229.36 LBS | SYSTOLIC BLOOD PRESSURE: 107 MMHG | BODY MASS INDEX: 43.34 KG/M2

## 2025-07-01 DIAGNOSIS — O99.210 OBESITY IN PREGNANCY (HHS-HCC): Primary | ICD-10-CM

## 2025-07-01 DIAGNOSIS — Z3A.36 36 WEEKS GESTATION OF PREGNANCY (HHS-HCC): ICD-10-CM

## 2025-07-01 DIAGNOSIS — E03.8 OTHER SPECIFIED HYPOTHYROIDISM: ICD-10-CM

## 2025-07-01 DIAGNOSIS — O99.019 ANTEPARTUM ANEMIA: ICD-10-CM

## 2025-07-01 PROCEDURE — 59025 FETAL NON-STRESS TEST: CPT | Performed by: OBSTETRICS & GYNECOLOGY

## 2025-07-01 PROCEDURE — 0501F PRENATAL FLOW SHEET: CPT | Performed by: OBSTETRICS & GYNECOLOGY

## 2025-07-01 NOTE — PROGRESS NOTES
GBS and NST today.    NST     FHR reactive, baseline 150s , no decels, at least 2 accelerations noted.  Audible fetal mvmts.  No ctxs.       Medical Problems       Problem List       Generalized anxiety disorder    Overview Signed 12/9/2024  9:21 AM by Jewels Spear MD   - well controlled on welbutrin and lexapro          Hypothyroidism    Overview Addendum 5/27/2025  9:30 AM by Jewels Spear MD   - on synthroid   - normal TSH 3rd trimester          Rh negative, maternal ([facility])    Overview Addendum 4/29/2025 10:53 AM by Jewels Spear MD   - Rhogam given 4/29          36 weeks gestation of pregnancy ([facility])    Overview Addendum 7/1/2025 10:00 AM by Marielena Jung MD   Desired provider in labor: [] CNM  [x] Physician  [x] Blood Products: [x] Yes, accepts [] No, needs counseling  [x] Initial BMI: 39.70   [x] Prenatal Labs: UTD  [x] Cervical Cancer Screening up to date- normal 2024   [x] Rh status: negative   [x] Genetic Screening:  Declines NIPS/carrier screening   [x] NT US: (11-13 wks): WNL  [x] Baby ASA (if indicated): started 1/14   [x] Pregnancy dated by: LMP c/w in-office USN at 7 weeks     [x] Anatomy US: (19-20 wks): WNL  [] Federal Sterilization consent signed (if indicated):  [x] 1hr GCT at 24-28wks: WNL  [x] Rhogam: <given 4/29   [] Fetal Surveillance: <> NSTs at 34 weeks   [x] Tdap: Declines   [x] Flu Vaccine: Declines   [x] Updated COVID vaccine recommended - declines     [x] Breastfeeding: Plans to breast feed, has pump   [] Postpartum Birth control method:   [x] GBS at 36 - 37 wks:  [] 39 weeks discussion of IOL vs. Expectant management:  [] Mode of delivery ( anticipated ):           History of vacuum extraction assisted delivery    Overview Signed 12/10/2024 10:31 AM by Jewels Spear MD   - pushed for 4.5 hours, then VAVD  - Baby weighed 7lbs, 9oz          Obesity in pregnancy ([facility])    Overview Addendum 6/16/2025  1:13 PM by Jewels Spear MD   - BMI  42 at first visit  - 30 week growth - EFW 74%  <> weekly testing at 34 weeks   - 36 week growth - pt declines            Antepartum anemia    Overview Addendum 6/18/2025 10:03 AM by Jewels Spear MD   - hemoglobin 9.4 with glucola  - repeat hemoglobin 9.3 - pt started on slow-Fe and is tolerating it better --> improved to 10.1            Thrombocytopenia    Overview Signed 6/18/2025 10:03 AM by Jewels Spear MD   - Plt 114 at 34 weeks   - Likely gestational thrombocytopenia

## 2025-07-04 LAB — GP B STREP SPEC QL CULT: NORMAL

## 2025-07-09 ENCOUNTER — APPOINTMENT (OUTPATIENT)
Dept: OBSTETRICS AND GYNECOLOGY | Facility: CLINIC | Age: 30
End: 2025-07-09
Payer: COMMERCIAL

## 2025-07-09 VITALS — SYSTOLIC BLOOD PRESSURE: 104 MMHG | DIASTOLIC BLOOD PRESSURE: 69 MMHG | WEIGHT: 231 LBS | BODY MASS INDEX: 43.65 KG/M2

## 2025-07-09 DIAGNOSIS — Z87.59 HISTORY OF VACUUM EXTRACTION ASSISTED DELIVERY: ICD-10-CM

## 2025-07-09 DIAGNOSIS — O99.019 ANTEPARTUM ANEMIA: ICD-10-CM

## 2025-07-09 DIAGNOSIS — D69.6 THROMBOCYTOPENIA: ICD-10-CM

## 2025-07-09 DIAGNOSIS — Z34.80 SUPERVISION OF OTHER NORMAL PREGNANCY, ANTEPARTUM (HHS-HCC): ICD-10-CM

## 2025-07-09 DIAGNOSIS — Z3A.37 37 WEEKS GESTATION OF PREGNANCY (HHS-HCC): Primary | ICD-10-CM

## 2025-07-09 DIAGNOSIS — O99.210 OBESITY IN PREGNANCY (HHS-HCC): ICD-10-CM

## 2025-07-09 DIAGNOSIS — F41.1 GENERALIZED ANXIETY DISORDER: ICD-10-CM

## 2025-07-09 PROCEDURE — 0501F PRENATAL FLOW SHEET: CPT | Performed by: OBSTETRICS & GYNECOLOGY

## 2025-07-09 PROCEDURE — 59025 FETAL NON-STRESS TEST: CPT | Performed by: OBSTETRICS & GYNECOLOGY

## 2025-07-09 NOTE — PROCEDURES
Yesy Orozco, a  at 37w6d with an KORINA of 2025, by Last Menstrual Period, was seen at [facility] for a nonstress test.    Non-Stress Test   Baseline Fetal Heart Rate for Non-Stress Test: 130 BPM  Variability in Waveform for Non-Stress Test: Moderate  Accelerations in Non-Stress Test: Yes  Decelerations in Non-Stress Test: None  Contractions in Non-Stress Test: Not present  Acoustic Stimulator for Non-Stress Test: No  Interpretation of Non-Stress Test   Interpretation of Non-Stress Test: Reactive

## 2025-07-09 NOTE — PROGRESS NOTES
"Patient presents for OBFU, NST - BMI   GBS negative  C/O: contractions last night, subsided   NST start at 9:15am    Deana Quan MA II    Routine prenatal visit     Subjective    HPI:  Doing OK but feels \"over it.\" Discussed rational for IOL by EDC.  Request submitted for  at 39.5.  NST done today - reactive.  Normal FM.     Objective    Vital Signs  /69   Wt 105 kg (231 lb)   LMP 10/17/2024   BMI 43.65 kg/m²     Yesy Orozco is a 29 y.o. yo  at 37w6d here for the following concerns which we addressed today:     Medical Problems       Problem List       Generalized anxiety disorder    Overview Signed 2024  9:21 AM by Jewels Spear MD   - well controlled on welbutrin and lexapro          Hypothyroidism    Overview Addendum 2025  9:30 AM by Jewels Spear MD   - on synthroid   - normal TSH 3rd trimester          Rh negative, maternal ([facility])    Overview Addendum 2025 10:53 AM by Jewels Spear MD   - Rhogam given           37 weeks gestation of pregnancy ([facility])    Overview Addendum 2025  9:54 AM by Jewels Spear MD   Desired provider in labor: [] CNM  [x] Physician  [x] Blood Products: [x] Yes, accepts [] No, needs counseling  [x] Initial BMI: 39.70   [x] Prenatal Labs: UTD  [x] Cervical Cancer Screening up to date- normal    [x] Rh status: negative   [x] Genetic Screening:  Declines NIPS/carrier screening   [x] NT US: (11-13 wks): WNL  [x] Baby ASA (if indicated): started    [x] Pregnancy dated by: LMP c/w in-office USN at 7 weeks     [x] Anatomy US: (19-20 wks): WNL  [] Federal Sterilization consent signed (if indicated):  [x] 1hr GCT at 24-28wks: WNL  [x] Rhogam: <given    [] Fetal Surveillance: <> NSTs at 34 weeks   [x] Tdap: Declines   [x] Flu Vaccine: Declines   [x] Updated COVID vaccine recommended - declines     [x] Breastfeeding: Plans to breast feed, has pump   [] Postpartum Birth control method:   [x] GBS at 36 - 37 " wks:  [] 39 weeks discussion of IOL vs. Expectant management: IOL submitted for  at 39.4   [] Mode of delivery ( anticipated ):            History of vacuum extraction assisted delivery    Overview Signed 12/10/2024 10:31 AM by Jewels Spear MD   - pushed for 4.5 hours, then VAVD  - Baby weighed 7lbs, 9oz          Obesity in pregnancy ([facility])    Overview Addendum 2025  1:13 PM by Jewels Spear MD   - BMI 42 at first visit  - 30 week growth - EFW 74%  <> weekly testing at 34 weeks   - 36 week growth - pt declines            Antepartum anemia    Overview Addendum 2025 10:03 AM by Jewels Spear MD   - hemoglobin 9.4 with glucola  - repeat hemoglobin 9.3 - pt started on slow-Fe and is tolerating it better --> improved to 10.1            Thrombocytopenia    Overview Signed 2025 10:03 AM by Jewels Spear MD   - Plt 114 at 34 weeks   - Likely gestational thrombocytopenia             Follow up in 1 week(s).

## 2025-07-18 ENCOUNTER — APPOINTMENT (OUTPATIENT)
Dept: OBSTETRICS AND GYNECOLOGY | Facility: CLINIC | Age: 30
End: 2025-07-18
Payer: COMMERCIAL

## 2025-07-18 VITALS — BODY MASS INDEX: 43.27 KG/M2 | WEIGHT: 229 LBS | SYSTOLIC BLOOD PRESSURE: 102 MMHG | DIASTOLIC BLOOD PRESSURE: 71 MMHG

## 2025-07-18 DIAGNOSIS — O99.210 OBESITY IN PREGNANCY (HHS-HCC): ICD-10-CM

## 2025-07-18 DIAGNOSIS — O99.019 ANTEPARTUM ANEMIA: ICD-10-CM

## 2025-07-18 DIAGNOSIS — Z3A.39 39 WEEKS GESTATION OF PREGNANCY (HHS-HCC): Primary | ICD-10-CM

## 2025-07-18 DIAGNOSIS — Z87.59 HISTORY OF VACUUM EXTRACTION ASSISTED DELIVERY: ICD-10-CM

## 2025-07-18 NOTE — PROCEDURES
Yesy Orozco, a  at 39w1d with an KORINA of 2025, by Last Menstrual Period, was seen at [facility] for a nonstress test.    Non-Stress Test   Baseline Fetal Heart Rate for Non-Stress Test: 135 BPM  Variability in Waveform for Non-Stress Test: Moderate  Accelerations in Non-Stress Test: Yes  Decelerations in Non-Stress Test: None  Contractions in Non-Stress Test: Not present  Acoustic Stimulator for Non-Stress Test: No  Interpretation of Non-Stress Test   Interpretation of Non-Stress Test: Reactive

## 2025-07-18 NOTE — PROGRESS NOTES
NST started @ 10:10am     Routine prenatal visit     Subjective    HPI:  Routine OB follow up visit. Feeling well overall- feels anxious about induction next week.  Lots of FM.  Cervix 1.5/50.  NST reactive.  Labor precautions reviewed.     Objective    Vital Signs  /71   Wt 104 kg (229 lb)   LMP 10/17/2024   BMI 43.27 kg/m²     Yesy Orozco is a 29 y.o. yo  at 39w1d here for the following concerns which we addressed today:     Medical Problems       Problem List       Generalized anxiety disorder    Overview Signed 2024  9:21 AM by Jewels Spear MD   - well controlled on welbutrin and lexapro          Hypothyroidism    Overview Addendum 2025  9:30 AM by Jewels Spear MD   - on synthroid   - normal TSH 3rd trimester          [facility]    Overview Addendum 2025 10:53 AM by Jewels Spear MD   - Rhogam given           39 weeks gestation of pregnancy ([facility])    Overview Addendum 2025 11:09 AM by Jewels Spear MD   Desired provider in labor: [] CNM  [x] Physician  [x] Blood Products: [x] Yes, accepts [] No, needs counseling  [x] Initial BMI: 39.70   [x] Prenatal Labs: UTD  [x] Cervical Cancer Screening up to date- normal    [x] Rh status: negative   [x] Genetic Screening:  Declines NIPS/carrier screening   [x] NT US: (11-13 wks): WNL  [x] Baby ASA (if indicated): started    [x] Pregnancy dated by: LMP c/w in-office USN at 7 weeks     [x] Anatomy US: (19-20 wks): WNL  [] Federal Sterilization consent signed (if indicated):  [x] 1hr GCT at 24-28wks: WNL  [x] Rhogam: <given    [x] Fetal Surveillance: <> NSTs at 34 weeks   [x] Tdap: Declines   [x] Flu Vaccine: Declines   [x] Updated COVID vaccine recommended - declines     [x] Breastfeeding: Plans to breast feed, has pump   [] Postpartum Birth control method:   [x] GBS at 36 - 37 wks:  [x] 39 weeks discussion of IOL vs. Expectant management: IOL  at 8:00  [x] Mode  of delivery ( anticipated ):            History of vacuum extraction assisted delivery    Overview Signed 12/10/2024 10:31 AM by Jewels Spear MD   - pushed for 4.5 hours, then VAVD  - Baby weighed 7lbs, 9oz          Obesity in pregnancy ([facility])    Overview Addendum 2025  1:13 PM by Jewels Spear MD   - BMI 42 at first visit  - 30 week growth - EFW 74%  <> weekly testing at 34 weeks   - 36 week growth - pt declines            Antepartum anemia    Overview Addendum 2025 10:03 AM by Jewels Spear MD   - hemoglobin 9.4 with glucola  - repeat hemoglobin 9.3 - pt started on slow-Fe and is tolerating it better --> improved to 10.1            Thrombocytopenia    Overview Signed 2025 10:03 AM by Jewels Spear MD   - Plt 114 at 34 weeks   - Likely gestational thrombocytopenia             Follow up for IOL in 4 days.

## 2025-07-22 ENCOUNTER — ANESTHESIA EVENT (OUTPATIENT)
Dept: OBSTETRICS AND GYNECOLOGY | Facility: HOSPITAL | Age: 30
End: 2025-07-22
Payer: COMMERCIAL

## 2025-07-22 ENCOUNTER — HOSPITAL ENCOUNTER (INPATIENT)
Facility: HOSPITAL | Age: 30
LOS: 2 days | Discharge: HOME | End: 2025-07-24
Attending: OBSTETRICS & GYNECOLOGY | Admitting: OBSTETRICS & GYNECOLOGY
Payer: COMMERCIAL

## 2025-07-22 ENCOUNTER — ANESTHESIA (OUTPATIENT)
Dept: OBSTETRICS AND GYNECOLOGY | Facility: HOSPITAL | Age: 30
End: 2025-07-22
Payer: COMMERCIAL

## 2025-07-22 ENCOUNTER — APPOINTMENT (OUTPATIENT)
Dept: OBSTETRICS AND GYNECOLOGY | Facility: HOSPITAL | Age: 30
End: 2025-07-22
Payer: COMMERCIAL

## 2025-07-22 DIAGNOSIS — R52 POSTPARTUM PAIN (HHS-HCC): Primary | ICD-10-CM

## 2025-07-22 DIAGNOSIS — K59.00 CONSTIPATION, UNSPECIFIED CONSTIPATION TYPE: ICD-10-CM

## 2025-07-22 DIAGNOSIS — G89.18 POSTOPERATIVE PAIN: ICD-10-CM

## 2025-07-22 DIAGNOSIS — Z34.80 SUPERVISION OF OTHER NORMAL PREGNANCY, ANTEPARTUM (HHS-HCC): ICD-10-CM

## 2025-07-22 LAB
ABO GROUP (TYPE) IN BLOOD: NORMAL
ANTIBODY SCREEN: NORMAL
BASE EXCESS BLDCOA CALC-SCNC: -9.1 MMOL/L (ref -10.8–-0.5)
BASE EXCESS BLDCOV CALC-SCNC: -6.8 MMOL/L (ref -8.1–-0.5)
BODY TEMPERATURE: ABNORMAL
BODY TEMPERATURE: ABNORMAL
ERYTHROCYTE [DISTWIDTH] IN BLOOD BY AUTOMATED COUNT: 15.3 % (ref 11.5–14.5)
HCO3 BLDCOA-SCNC: 21.1 MMOL/L (ref 15–29)
HCO3 BLDCOV-SCNC: 21.6 MMOL/L (ref 16–26)
HCT VFR BLD AUTO: 35.2 % (ref 36–46)
HGB BLD-MCNC: 12 G/DL (ref 12–16)
HOLD SPECIMEN: NORMAL
INHALED O2 CONCENTRATION: 21 %
INHALED O2 CONCENTRATION: 21 %
MCH RBC QN AUTO: 28.3 PG (ref 26–34)
MCHC RBC AUTO-ENTMCNC: 34.1 G/DL (ref 32–36)
MCV RBC AUTO: 83 FL (ref 80–100)
NRBC BLD-RTO: 0 /100 WBCS (ref 0–0)
OXYHGB MFR BLDCOA: 9 % (ref 94–98)
OXYHGB MFR BLDCOV: 21.3 % (ref 94–98)
PCO2 BLDCOA: 65 MM HG (ref 31–75)
PCO2 BLDCOV: 54 MM HG (ref 22–53)
PH BLDCOA: 7.12 PH (ref 7.08–7.39)
PH BLDCOV: 7.21 PH (ref 7.19–7.47)
PLATELET # BLD AUTO: 133 X10*3/UL (ref 150–450)
PO2 BLDCOA: 8 MM HG (ref 5–31)
PO2 BLDCOV: 16 MM HG (ref 13–37)
RBC # BLD AUTO: 4.24 X10*6/UL (ref 4–5.2)
RH FACTOR (ANTIGEN D): NORMAL
SAO2 % BLDCOA: 9 % (ref 3–69)
SAO2 % BLDCOV: 22 % (ref 16–84)
TREPONEMA PALLIDUM IGG+IGM AB [PRESENCE] IN SERUM OR PLASMA BY IMMUNOASSAY: NONREACTIVE
WBC # BLD AUTO: 9.7 X10*3/UL (ref 4.4–11.3)

## 2025-07-22 PROCEDURE — 2500000002 HC RX 250 W HCPCS SELF ADMINISTERED DRUGS (ALT 637 FOR MEDICARE OP, ALT 636 FOR OP/ED): Performed by: OBSTETRICS & GYNECOLOGY

## 2025-07-22 PROCEDURE — 2780000003 HC OR 278 NO HCPCS: Performed by: OBSTETRICS & GYNECOLOGY

## 2025-07-22 PROCEDURE — 2500000005 HC RX 250 GENERAL PHARMACY W/O HCPCS: Performed by: NURSE ANESTHETIST, CERTIFIED REGISTERED

## 2025-07-22 PROCEDURE — P9045 ALBUMIN (HUMAN), 5%, 250 ML: HCPCS | Mod: JZ | Performed by: NURSE ANESTHETIST, CERTIFIED REGISTERED

## 2025-07-22 PROCEDURE — 86923 COMPATIBILITY TEST ELECTRIC: CPT

## 2025-07-22 PROCEDURE — 2500000004 HC RX 250 GENERAL PHARMACY W/ HCPCS (ALT 636 FOR OP/ED): Performed by: OBSTETRICS & GYNECOLOGY

## 2025-07-22 PROCEDURE — 82810 BLOOD GASES O2 SAT ONLY: CPT | Performed by: OBSTETRICS & GYNECOLOGY

## 2025-07-22 PROCEDURE — 2500000001 HC RX 250 WO HCPCS SELF ADMINISTERED DRUGS (ALT 637 FOR MEDICARE OP): Performed by: OBSTETRICS & GYNECOLOGY

## 2025-07-22 PROCEDURE — 2720000007 HC OR 272 NO HCPCS: Performed by: OBSTETRICS & GYNECOLOGY

## 2025-07-22 PROCEDURE — 2500000004 HC RX 250 GENERAL PHARMACY W/ HCPCS (ALT 636 FOR OP/ED): Mod: JZ | Performed by: NURSE ANESTHETIST, CERTIFIED REGISTERED

## 2025-07-22 PROCEDURE — 59050 FETAL MONITOR W/REPORT: CPT

## 2025-07-22 PROCEDURE — 3700000014 HC AN EPIDURAL BLOCK CHARGE: Performed by: OBSTETRICS & GYNECOLOGY

## 2025-07-22 PROCEDURE — 2500000004 HC RX 250 GENERAL PHARMACY W/ HCPCS (ALT 636 FOR OP/ED): Performed by: NURSE ANESTHETIST, CERTIFIED REGISTERED

## 2025-07-22 PROCEDURE — 2500000004 HC RX 250 GENERAL PHARMACY W/ HCPCS (ALT 636 FOR OP/ED): Mod: JZ | Performed by: OBSTETRICS & GYNECOLOGY

## 2025-07-22 PROCEDURE — 1220000001 HC OB SEMI-PRIVATE ROOM DAILY

## 2025-07-22 PROCEDURE — 85027 COMPLETE CBC AUTOMATED: CPT | Performed by: OBSTETRICS & GYNECOLOGY

## 2025-07-22 PROCEDURE — 86900 BLOOD TYPING SEROLOGIC ABO: CPT | Performed by: OBSTETRICS & GYNECOLOGY

## 2025-07-22 PROCEDURE — 7210000002 HC LABOR PER HOUR

## 2025-07-22 PROCEDURE — 36415 COLL VENOUS BLD VENIPUNCTURE: CPT | Performed by: OBSTETRICS & GYNECOLOGY

## 2025-07-22 PROCEDURE — 59510 CESAREAN DELIVERY: CPT | Performed by: OBSTETRICS & GYNECOLOGY

## 2025-07-22 PROCEDURE — 7100000016 HC LABOR RECOVERY PER HOUR: Performed by: OBSTETRICS & GYNECOLOGY

## 2025-07-22 PROCEDURE — 2500000001 HC RX 250 WO HCPCS SELF ADMINISTERED DRUGS (ALT 637 FOR MEDICARE OP)

## 2025-07-22 PROCEDURE — 86780 TREPONEMA PALLIDUM: CPT | Mod: STJLAB | Performed by: OBSTETRICS & GYNECOLOGY

## 2025-07-22 PROCEDURE — 2500000001 HC RX 250 WO HCPCS SELF ADMINISTERED DRUGS (ALT 637 FOR MEDICARE OP): Performed by: NURSE ANESTHETIST, CERTIFIED REGISTERED

## 2025-07-22 PROCEDURE — 59514 CESAREAN DELIVERY ONLY: CPT | Performed by: OBSTETRICS & GYNECOLOGY

## 2025-07-22 RX ORDER — NIFEDIPINE 10 MG/1
10 CAPSULE ORAL ONCE AS NEEDED
Status: DISCONTINUED | OUTPATIENT
Start: 2025-07-22 | End: 2025-07-22 | Stop reason: HOSPADM

## 2025-07-22 RX ORDER — HYDRALAZINE HYDROCHLORIDE 20 MG/ML
5 INJECTION INTRAMUSCULAR; INTRAVENOUS ONCE AS NEEDED
Status: DISCONTINUED | OUTPATIENT
Start: 2025-07-22 | End: 2025-07-22 | Stop reason: HOSPADM

## 2025-07-22 RX ORDER — NALOXONE HYDROCHLORIDE 0.4 MG/ML
0.1 INJECTION, SOLUTION INTRAMUSCULAR; INTRAVENOUS; SUBCUTANEOUS EVERY 5 MIN PRN
Status: DISCONTINUED | OUTPATIENT
Start: 2025-07-22 | End: 2025-07-24 | Stop reason: HOSPADM

## 2025-07-22 RX ORDER — MISOPROSTOL 100 UG/1
TABLET ORAL AS NEEDED
Status: DISCONTINUED | OUTPATIENT
Start: 2025-07-22 | End: 2025-07-22 | Stop reason: HOSPADM

## 2025-07-22 RX ORDER — ONDANSETRON 4 MG/1
4 TABLET, FILM COATED ORAL EVERY 6 HOURS PRN
Status: DISCONTINUED | OUTPATIENT
Start: 2025-07-22 | End: 2025-07-22

## 2025-07-22 RX ORDER — ESCITALOPRAM OXALATE 10 MG/1
10 TABLET ORAL DAILY
Status: DISCONTINUED | OUTPATIENT
Start: 2025-07-22 | End: 2025-07-22

## 2025-07-22 RX ORDER — TRANEXAMIC ACID 1 G/10ML
1000 INJECTION, SOLUTION INTRAVENOUS ONCE AS NEEDED
Status: DISCONTINUED | OUTPATIENT
Start: 2025-07-22 | End: 2025-07-22 | Stop reason: HOSPADM

## 2025-07-22 RX ORDER — PHENYLEPHRINE HCL IN 0.9% NACL 1 MG/10 ML
SYRINGE (ML) INTRAVENOUS AS NEEDED
Status: DISCONTINUED | OUTPATIENT
Start: 2025-07-22 | End: 2025-07-22

## 2025-07-22 RX ORDER — NIFEDIPINE 10 MG/1
10 CAPSULE ORAL ONCE AS NEEDED
Status: DISCONTINUED | OUTPATIENT
Start: 2025-07-22 | End: 2025-07-24 | Stop reason: HOSPADM

## 2025-07-22 RX ORDER — LABETALOL HYDROCHLORIDE 5 MG/ML
20 INJECTION, SOLUTION INTRAVENOUS ONCE AS NEEDED
Status: DISCONTINUED | OUTPATIENT
Start: 2025-07-22 | End: 2025-07-22 | Stop reason: HOSPADM

## 2025-07-22 RX ORDER — TRANEXAMIC ACID 1 G/10ML
1000 INJECTION, SOLUTION INTRAVENOUS ONCE AS NEEDED
Status: DISCONTINUED | OUTPATIENT
Start: 2025-07-22 | End: 2025-07-24 | Stop reason: HOSPADM

## 2025-07-22 RX ORDER — OXYTOCIN 10 [USP'U]/ML
10 INJECTION, SOLUTION INTRAMUSCULAR; INTRAVENOUS ONCE AS NEEDED
Status: DISCONTINUED | OUTPATIENT
Start: 2025-07-22 | End: 2025-07-22 | Stop reason: HOSPADM

## 2025-07-22 RX ORDER — DEXMEDETOMIDINE HYDROCHLORIDE 100 UG/ML
INJECTION, SOLUTION INTRAVENOUS AS NEEDED
Status: DISCONTINUED | OUTPATIENT
Start: 2025-07-22 | End: 2025-07-22

## 2025-07-22 RX ORDER — OXYTOCIN 10 [USP'U]/ML
10 INJECTION, SOLUTION INTRAMUSCULAR; INTRAVENOUS ONCE AS NEEDED
Status: DISCONTINUED | OUTPATIENT
Start: 2025-07-22 | End: 2025-07-24 | Stop reason: HOSPADM

## 2025-07-22 RX ORDER — MINERAL OIL
OIL (ML) ORAL
Status: COMPLETED
Start: 2025-07-22 | End: 2025-07-22

## 2025-07-22 RX ORDER — CARBOPROST TROMETHAMINE 250 UG/ML
250 INJECTION, SOLUTION INTRAMUSCULAR ONCE AS NEEDED
Status: DISCONTINUED | OUTPATIENT
Start: 2025-07-22 | End: 2025-07-24 | Stop reason: HOSPADM

## 2025-07-22 RX ORDER — ALBUMIN HUMAN 50 G/1000ML
12.5 SOLUTION INTRAVENOUS ONCE
Status: COMPLETED | OUTPATIENT
Start: 2025-07-22 | End: 2025-07-22

## 2025-07-22 RX ORDER — ACETAMINOPHEN 120 MG/1
SUPPOSITORY RECTAL AS NEEDED
Status: DISCONTINUED | OUTPATIENT
Start: 2025-07-22 | End: 2025-07-22

## 2025-07-22 RX ORDER — DIPHENHYDRAMINE HYDROCHLORIDE 50 MG/ML
25 INJECTION, SOLUTION INTRAMUSCULAR; INTRAVENOUS EVERY 4 HOURS PRN
Status: DISCONTINUED | OUTPATIENT
Start: 2025-07-22 | End: 2025-07-24 | Stop reason: HOSPADM

## 2025-07-22 RX ORDER — BUPROPION HYDROCHLORIDE 150 MG/1
150 TABLET ORAL DAILY
Status: DISCONTINUED | OUTPATIENT
Start: 2025-07-22 | End: 2025-07-22

## 2025-07-22 RX ORDER — ONDANSETRON HYDROCHLORIDE 2 MG/ML
4 INJECTION, SOLUTION INTRAVENOUS EVERY 6 HOURS PRN
Status: DISCONTINUED | OUTPATIENT
Start: 2025-07-22 | End: 2025-07-24 | Stop reason: HOSPADM

## 2025-07-22 RX ORDER — LIDOCAINE HYDROCHLORIDE 10 MG/ML
30 INJECTION, SOLUTION INFILTRATION; PERINEURAL ONCE AS NEEDED
Status: COMPLETED | OUTPATIENT
Start: 2025-07-22 | End: 2025-07-22

## 2025-07-22 RX ORDER — HYDROMORPHONE HYDROCHLORIDE 1 MG/ML
0.2 INJECTION, SOLUTION INTRAMUSCULAR; INTRAVENOUS; SUBCUTANEOUS EVERY 5 MIN PRN
OUTPATIENT
Start: 2025-07-22

## 2025-07-22 RX ORDER — MORPHINE SULFATE 1 MG/ML
INJECTION, SOLUTION EPIDURAL; INTRATHECAL; INTRAVENOUS AS NEEDED
Status: DISCONTINUED | OUTPATIENT
Start: 2025-07-22 | End: 2025-07-22

## 2025-07-22 RX ORDER — LIDOCAINE HCL/EPINEPHRINE/PF 2%-1:200K
VIAL (ML) INJECTION AS NEEDED
Status: DISCONTINUED | OUTPATIENT
Start: 2025-07-22 | End: 2025-07-22

## 2025-07-22 RX ORDER — DIPHENHYDRAMINE HCL 25 MG
25 TABLET ORAL EVERY 4 HOURS PRN
Status: DISCONTINUED | OUTPATIENT
Start: 2025-07-22 | End: 2025-07-24 | Stop reason: HOSPADM

## 2025-07-22 RX ORDER — METHYLERGONOVINE MALEATE 0.2 MG/ML
0.2 INJECTION INTRAVENOUS ONCE AS NEEDED
Status: DISCONTINUED | OUTPATIENT
Start: 2025-07-22 | End: 2025-07-22 | Stop reason: HOSPADM

## 2025-07-22 RX ORDER — MISOPROSTOL 200 UG/1
800 TABLET ORAL ONCE AS NEEDED
Status: DISCONTINUED | OUTPATIENT
Start: 2025-07-22 | End: 2025-07-24 | Stop reason: HOSPADM

## 2025-07-22 RX ORDER — CALCIUM CARBONATE 200(500)MG
1 TABLET,CHEWABLE ORAL EVERY 6 HOURS PRN
Status: DISCONTINUED | OUTPATIENT
Start: 2025-07-22 | End: 2025-07-22

## 2025-07-22 RX ORDER — MISOPROSTOL 200 UG/1
800 TABLET ORAL ONCE AS NEEDED
Status: DISCONTINUED | OUTPATIENT
Start: 2025-07-22 | End: 2025-07-22 | Stop reason: HOSPADM

## 2025-07-22 RX ORDER — LABETALOL HYDROCHLORIDE 5 MG/ML
20 INJECTION, SOLUTION INTRAVENOUS ONCE AS NEEDED
Status: DISCONTINUED | OUTPATIENT
Start: 2025-07-22 | End: 2025-07-24 | Stop reason: HOSPADM

## 2025-07-22 RX ORDER — ADHESIVE BANDAGE
10 BANDAGE TOPICAL
Status: DISCONTINUED | OUTPATIENT
Start: 2025-07-22 | End: 2025-07-24 | Stop reason: HOSPADM

## 2025-07-22 RX ORDER — METHYLERGONOVINE MALEATE 0.2 MG/ML
0.2 INJECTION INTRAVENOUS ONCE AS NEEDED
Status: DISCONTINUED | OUTPATIENT
Start: 2025-07-22 | End: 2025-07-24 | Stop reason: HOSPADM

## 2025-07-22 RX ORDER — LIDOCAINE 560 MG/1
1 PATCH PERCUTANEOUS; TOPICAL; TRANSDERMAL
Status: DISCONTINUED | OUTPATIENT
Start: 2025-07-22 | End: 2025-07-24 | Stop reason: HOSPADM

## 2025-07-22 RX ORDER — LOPERAMIDE HYDROCHLORIDE 2 MG/1
4 CAPSULE ORAL EVERY 2 HOUR PRN
Status: DISCONTINUED | OUTPATIENT
Start: 2025-07-22 | End: 2025-07-22 | Stop reason: HOSPADM

## 2025-07-22 RX ORDER — ACETAMINOPHEN 325 MG/1
975 TABLET ORAL EVERY 6 HOURS SCHEDULED
Status: DISCONTINUED | OUTPATIENT
Start: 2025-07-22 | End: 2025-07-24 | Stop reason: HOSPADM

## 2025-07-22 RX ORDER — OXYTOCIN/0.9 % SODIUM CHLORIDE 30/500 ML
60 PLASTIC BAG, INJECTION (ML) INTRAVENOUS
Status: DISCONTINUED | OUTPATIENT
Start: 2025-07-22 | End: 2025-07-22 | Stop reason: HOSPADM

## 2025-07-22 RX ORDER — BISACODYL 10 MG/1
10 SUPPOSITORY RECTAL DAILY PRN
Status: DISCONTINUED | OUTPATIENT
Start: 2025-07-22 | End: 2025-07-24 | Stop reason: HOSPADM

## 2025-07-22 RX ORDER — POLYETHYLENE GLYCOL 3350 17 G/17G
17 POWDER, FOR SOLUTION ORAL 2 TIMES DAILY PRN
Status: DISCONTINUED | OUTPATIENT
Start: 2025-07-22 | End: 2025-07-24 | Stop reason: HOSPADM

## 2025-07-22 RX ORDER — ENOXAPARIN SODIUM 100 MG/ML
60 INJECTION SUBCUTANEOUS EVERY 24 HOURS
Status: DISCONTINUED | OUTPATIENT
Start: 2025-07-23 | End: 2025-07-24 | Stop reason: HOSPADM

## 2025-07-22 RX ORDER — AZITHROMYCIN MONOHYDRATE 500 MG/5ML
INJECTION, POWDER, LYOPHILIZED, FOR SOLUTION INTRAVENOUS AS NEEDED
Status: DISCONTINUED | OUTPATIENT
Start: 2025-07-22 | End: 2025-07-22

## 2025-07-22 RX ORDER — ONDANSETRON 4 MG/1
4 TABLET, FILM COATED ORAL EVERY 6 HOURS PRN
Status: DISCONTINUED | OUTPATIENT
Start: 2025-07-22 | End: 2025-07-24 | Stop reason: HOSPADM

## 2025-07-22 RX ORDER — METHYLERGONOVINE MALEATE 0.2 MG/ML
INJECTION INTRAVENOUS AS NEEDED
Status: DISCONTINUED | OUTPATIENT
Start: 2025-07-22 | End: 2025-07-22 | Stop reason: HOSPADM

## 2025-07-22 RX ORDER — OXYCODONE HYDROCHLORIDE 10 MG/1
10 TABLET ORAL EVERY 4 HOURS PRN
Status: DISCONTINUED | OUTPATIENT
Start: 2025-07-23 | End: 2025-07-24 | Stop reason: HOSPADM

## 2025-07-22 RX ORDER — TERBUTALINE SULFATE 1 MG/ML
0.25 INJECTION SUBCUTANEOUS ONCE AS NEEDED
Status: DISCONTINUED | OUTPATIENT
Start: 2025-07-22 | End: 2025-07-22 | Stop reason: HOSPADM

## 2025-07-22 RX ORDER — IBUPROFEN 600 MG/1
600 TABLET, FILM COATED ORAL EVERY 6 HOURS
Status: DISCONTINUED | OUTPATIENT
Start: 2025-07-23 | End: 2025-07-24 | Stop reason: HOSPADM

## 2025-07-22 RX ORDER — CEFAZOLIN SODIUM 2 G/100ML
INJECTION, SOLUTION INTRAVENOUS AS NEEDED
Status: DISCONTINUED | OUTPATIENT
Start: 2025-07-22 | End: 2025-07-22

## 2025-07-22 RX ORDER — SIMETHICONE 80 MG
80 TABLET,CHEWABLE ORAL 4 TIMES DAILY PRN
Status: DISCONTINUED | OUTPATIENT
Start: 2025-07-22 | End: 2025-07-24 | Stop reason: HOSPADM

## 2025-07-22 RX ORDER — FENTANYL/ROPIVACAINE/NS/PF 2MCG/ML-.2
0-25 PLASTIC BAG, INJECTION (ML) INJECTION CONTINUOUS
Refills: 0 | Status: DISCONTINUED | OUTPATIENT
Start: 2025-07-22 | End: 2025-07-22

## 2025-07-22 RX ORDER — OXYCODONE HYDROCHLORIDE 5 MG/1
5 TABLET ORAL EVERY 4 HOURS PRN
Status: DISCONTINUED | OUTPATIENT
Start: 2025-07-23 | End: 2025-07-24 | Stop reason: HOSPADM

## 2025-07-22 RX ORDER — CARBOPROST TROMETHAMINE 250 UG/ML
250 INJECTION, SOLUTION INTRAMUSCULAR ONCE AS NEEDED
Status: DISCONTINUED | OUTPATIENT
Start: 2025-07-22 | End: 2025-07-22 | Stop reason: HOSPADM

## 2025-07-22 RX ORDER — KETOROLAC TROMETHAMINE 30 MG/ML
30 INJECTION, SOLUTION INTRAMUSCULAR; INTRAVENOUS EVERY 6 HOURS
Status: COMPLETED | OUTPATIENT
Start: 2025-07-22 | End: 2025-07-23

## 2025-07-22 RX ORDER — LIDOCAINE HYDROCHLORIDE AND EPINEPHRINE 15; 5 MG/ML; UG/ML
INJECTION, SOLUTION EPIDURAL AS NEEDED
Status: DISCONTINUED | OUTPATIENT
Start: 2025-07-22 | End: 2025-07-22

## 2025-07-22 RX ORDER — FENTANYL CITRATE 50 UG/ML
INJECTION, SOLUTION INTRAMUSCULAR; INTRAVENOUS AS NEEDED
Status: DISCONTINUED | OUTPATIENT
Start: 2025-07-22 | End: 2025-07-22

## 2025-07-22 RX ORDER — HYDROMORPHONE HYDROCHLORIDE 0.2 MG/ML
0.2 INJECTION INTRAMUSCULAR; INTRAVENOUS; SUBCUTANEOUS EVERY 5 MIN PRN
Status: DISCONTINUED | OUTPATIENT
Start: 2025-07-22 | End: 2025-07-24 | Stop reason: HOSPADM

## 2025-07-22 RX ORDER — DIPHENHYDRAMINE HYDROCHLORIDE 50 MG/ML
INJECTION, SOLUTION INTRAMUSCULAR; INTRAVENOUS AS NEEDED
Status: DISCONTINUED | OUTPATIENT
Start: 2025-07-22 | End: 2025-07-22

## 2025-07-22 RX ORDER — ONDANSETRON HYDROCHLORIDE 2 MG/ML
4 INJECTION, SOLUTION INTRAVENOUS EVERY 6 HOURS PRN
Status: DISCONTINUED | OUTPATIENT
Start: 2025-07-22 | End: 2025-07-22

## 2025-07-22 RX ORDER — HYDRALAZINE HYDROCHLORIDE 20 MG/ML
5 INJECTION INTRAMUSCULAR; INTRAVENOUS ONCE AS NEEDED
Status: DISCONTINUED | OUTPATIENT
Start: 2025-07-22 | End: 2025-07-24 | Stop reason: HOSPADM

## 2025-07-22 RX ORDER — SODIUM CHLORIDE, SODIUM LACTATE, POTASSIUM CHLORIDE, CALCIUM CHLORIDE 600; 310; 30; 20 MG/100ML; MG/100ML; MG/100ML; MG/100ML
75 INJECTION, SOLUTION INTRAVENOUS CONTINUOUS
Status: DISCONTINUED | OUTPATIENT
Start: 2025-07-22 | End: 2025-07-22

## 2025-07-22 RX ORDER — LOPERAMIDE HYDROCHLORIDE 2 MG/1
4 CAPSULE ORAL EVERY 2 HOUR PRN
Status: DISCONTINUED | OUTPATIENT
Start: 2025-07-22 | End: 2025-07-24 | Stop reason: HOSPADM

## 2025-07-22 RX ADMIN — ESCITALOPRAM OXALATE 10 MG: 10 TABLET ORAL at 11:57

## 2025-07-22 RX ADMIN — KETOROLAC TROMETHAMINE 30 MG: 30 INJECTION, SOLUTION INTRAMUSCULAR at 20:04

## 2025-07-22 RX ADMIN — Medication 10 ML/HR: at 07:05

## 2025-07-22 RX ADMIN — LEVOTHYROXINE SODIUM 125 MCG: 0.1 TABLET ORAL at 11:57

## 2025-07-22 RX ADMIN — OXYTOCIN 600 MILLI-UNITS/MIN: 10 INJECTION, SOLUTION INTRAMUSCULAR; INTRAVENOUS at 07:58

## 2025-07-22 RX ADMIN — CEFAZOLIN SODIUM 2 G: 2 INJECTION, SOLUTION INTRAVENOUS at 07:53

## 2025-07-22 RX ADMIN — ALBUMIN HUMAN 12.5 G: 0.05 INJECTION, SOLUTION INTRAVENOUS at 10:15

## 2025-07-22 RX ADMIN — SODIUM CHLORIDE, POTASSIUM CHLORIDE, SODIUM LACTATE AND CALCIUM CHLORIDE: 600; 310; 30; 20 INJECTION, SOLUTION INTRAVENOUS at 08:56

## 2025-07-22 RX ADMIN — SODIUM CHLORIDE, POTASSIUM CHLORIDE, SODIUM LACTATE AND CALCIUM CHLORIDE 500 ML: 600; 310; 30; 20 INJECTION, SOLUTION INTRAVENOUS at 06:30

## 2025-07-22 RX ADMIN — Medication 100 MCG: at 09:45

## 2025-07-22 RX ADMIN — LIDOCAINE HYDROCHLORIDE 3 ML: 10 INJECTION, SOLUTION INFILTRATION; PERINEURAL at 06:17

## 2025-07-22 RX ADMIN — LIDOCAINE HYDROCHLORIDE,EPINEPHRINE BITARTRATE 5 ML: 20; .005 INJECTION, SOLUTION EPIDURAL; INFILTRATION; INTRACAUDAL; PERINEURAL at 07:38

## 2025-07-22 RX ADMIN — FENTANYL CITRATE 10 MCG: 50 INJECTION, SOLUTION INTRAMUSCULAR; INTRAVENOUS at 06:31

## 2025-07-22 RX ADMIN — AZITHROMYCIN MONOHYDRATE 500 MG: 500 INJECTION, POWDER, LYOPHILIZED, FOR SOLUTION INTRAVENOUS at 07:51

## 2025-07-22 RX ADMIN — DEXAMETHASONE SODIUM PHOSPHATE 4 MG: 4 INJECTION, SOLUTION INTRAMUSCULAR; INTRAVENOUS at 07:46

## 2025-07-22 RX ADMIN — LIDOCAINE HYDROCHLORIDE AND EPINEPHRINE 3 ML: 15; 5 INJECTION, SOLUTION EPIDURAL at 06:33

## 2025-07-22 RX ADMIN — KETOROLAC TROMETHAMINE 30 MG: 30 INJECTION, SOLUTION INTRAMUSCULAR at 13:21

## 2025-07-22 RX ADMIN — CEFAZOLIN SODIUM 2 G: 2 INJECTION, SOLUTION INTRAVENOUS at 08:25

## 2025-07-22 RX ADMIN — TRANEXAMIC ACID 1000 MG: 1 INJECTION, SOLUTION INTRAVENOUS at 07:52

## 2025-07-22 RX ADMIN — LIDOCAINE HYDROCHLORIDE,EPINEPHRINE BITARTRATE 10 ML: 20; .005 INJECTION, SOLUTION EPIDURAL; INFILTRATION; INTRACAUDAL; PERINEURAL at 07:33

## 2025-07-22 RX ADMIN — BUPROPION HYDROCHLORIDE 150 MG: 150 TABLET, EXTENDED RELEASE ORAL at 11:58

## 2025-07-22 RX ADMIN — DIPHENHYDRAMINE HYDROCHLORIDE 15 MG: 50 INJECTION, SOLUTION INTRAMUSCULAR; INTRAVENOUS at 08:45

## 2025-07-22 RX ADMIN — PHENYLEPHRINE HYDROCHLORIDE 0.1 MCG/KG/MIN: 10 INJECTION INTRAVENOUS at 08:03

## 2025-07-22 RX ADMIN — LIDOCAINE HYDROCHLORIDE,EPINEPHRINE BITARTRATE 5 ML: 20; .005 INJECTION, SOLUTION EPIDURAL; INFILTRATION; INTRACAUDAL; PERINEURAL at 07:43

## 2025-07-22 RX ADMIN — MINERAL OIL: 1000 SOLUTION ORAL at 07:10

## 2025-07-22 RX ADMIN — SODIUM CHLORIDE, POTASSIUM CHLORIDE, SODIUM LACTATE AND CALCIUM CHLORIDE 500 ML: 600; 310; 30; 20 INJECTION, SOLUTION INTRAVENOUS at 05:50

## 2025-07-22 RX ADMIN — DEXMEDETOMIDINE HYDROCHLORIDE 10 MCG: 100 INJECTION, SOLUTION, CONCENTRATE INTRAVENOUS at 08:36

## 2025-07-22 RX ADMIN — ACETAMINOPHEN 975 MG: 325 TABLET ORAL at 13:21

## 2025-07-22 RX ADMIN — SODIUM CHLORIDE, POTASSIUM CHLORIDE, SODIUM LACTATE AND CALCIUM CHLORIDE: 600; 310; 30; 20 INJECTION, SOLUTION INTRAVENOUS at 07:27

## 2025-07-22 RX ADMIN — Medication 100 MCG: at 08:06

## 2025-07-22 RX ADMIN — MORPHINE SULFATE 3 MG: 1 INJECTION, SOLUTION EPIDURAL; INTRATHECAL; INTRAVENOUS at 07:58

## 2025-07-22 RX ADMIN — ACETAMINOPHEN 975 MG: 325 TABLET ORAL at 20:04

## 2025-07-22 RX ADMIN — Medication 100 MCG: at 09:03

## 2025-07-22 RX ADMIN — LIDOCAINE HYDROCHLORIDE AND EPINEPHRINE 2 ML: 15; 5 INJECTION, SOLUTION EPIDURAL at 06:35

## 2025-07-22 RX ADMIN — ACETAMINOPHEN 650 MG: 120 SUPPOSITORY RECTAL at 09:04

## 2025-07-22 RX ADMIN — Medication 100 MCG: at 09:13

## 2025-07-22 RX ADMIN — ONDANSETRON 4 MG: 2 INJECTION, SOLUTION INTRAMUSCULAR; INTRAVENOUS at 07:40

## 2025-07-22 SDOH — ECONOMIC STABILITY: FOOD INSECURITY: WITHIN THE PAST 12 MONTHS, YOU WORRIED THAT YOUR FOOD WOULD RUN OUT BEFORE YOU GOT THE MONEY TO BUY MORE.: NEVER TRUE

## 2025-07-22 SDOH — ECONOMIC STABILITY: FOOD INSECURITY: WITHIN THE PAST 12 MONTHS, THE FOOD YOU BOUGHT JUST DIDN'T LAST AND YOU DIDN'T HAVE MONEY TO GET MORE.: NEVER TRUE

## 2025-07-22 SDOH — HEALTH STABILITY: MENTAL HEALTH: WERE YOU ABLE TO COMPLETE ALL THE BEHAVIORAL HEALTH SCREENINGS?: NO

## 2025-07-22 SDOH — HEALTH STABILITY: MENTAL HEALTH: REASON FOR INCOMPLETE PSYCHIATRIC SCREENING: UNABLE TO ASSESS

## 2025-07-22 ASSESSMENT — ACTIVITIES OF DAILY LIVING (ADL): LACK_OF_TRANSPORTATION: NO

## 2025-07-22 ASSESSMENT — PAIN SCALES - GENERAL
PAINLEVEL_OUTOF10: 9
PAINLEVEL_OUTOF10: 0 - NO PAIN
PAINLEVEL_OUTOF10: 9
PAINLEVEL_OUTOF10: 2
PAINLEVEL_OUTOF10: 9
PAINLEVEL_OUTOF10: 3
PAINLEVEL_OUTOF10: 0 - NO PAIN

## 2025-07-22 ASSESSMENT — PAIN DESCRIPTION - DESCRIPTORS
DESCRIPTORS: CRAMPING
DESCRIPTORS: SORE

## 2025-07-22 NOTE — ANESTHESIA POSTPROCEDURE EVALUATION
Patient: Yesy Orozco    Procedure Summary       Date: 07/22/25 Room / Location:     Anesthesia Start: 0609 Anesthesia Stop: 0920    Procedure: Labor Analgesia Diagnosis:     Scheduled Providers:  Responsible Provider: ALCON Toro    Anesthesia Type: epidural ASA Status: 3            Anesthesia Type: epidural    Vitals Value Taken Time   BP BP: 111/59    07/22/25 12:07   Temp Temp: 36.8 °C (98.2 °F)    07/22/25 12:07   Pulse 84 07/22/25 11:59   Resp Resp: 18    07/22/25 12:07   SpO2 100 % 07/22/25 11:59       Anesthesia Post Evaluation    Patient location during evaluation: bedside  Patient participation: complete - patient cannot participate  Level of consciousness: awake  Pain management: adequate  Multimodal analgesia pain management approach  Airway patency: patent  Cardiovascular status: acceptable  Respiratory status: acceptable  Hydration status: acceptable  Postoperative Nausea and Vomiting: none        No notable events documented.

## 2025-07-22 NOTE — H&P
OB Admission H&P    Assessment/Plan    Yesy Orozco is a 29 y.o.  at 39w5d, KORINA: 2025, by Last Menstrual Period, who is admitted for Labor.    Plan   -Admit to L&D, consented  -T&S, CBC, and Syphilis  -Epidural at patient request  -Recheck as clinically indicated by maternal or fetal status    Fetal Status  -NST reactive, reassuring   -Presentation cephalic based on ultrasound  -EFW 8 lb by Le[place]  -GBS neg        Pregnancy Problems (from 24 to present)       Problem Noted Diagnosed Resolved    Antepartum anemia 2025 by Jewels Spear MD  No    Priority:  Medium       Overview Addendum 2025 10:03 AM by Jewels Spear MD   - hemoglobin 9.4 with glucola  - repeat hemoglobin 9.3 - pt started on slow-Fe and is tolerating it better --> improved to 10.1            Obesity in pregnancy ([facility]) 12/10/2024 by Jewels Spear MD  No    Priority:  Medium       Overview Addendum 2025  1:13 PM by Jewels Spear MD   - BMI 42 at first visit  - 30 week growth - EFW 74%  <> weekly testing at 34 weeks   - 36 week growth - pt declines            39 weeks gestation of pregnancy ([facility]) 2024 by Jewels Spear MD  No    Priority:  Medium       Overview Addendum 2025 11:09 AM by Jewels Spear MD   Desired provider in labor: [] CNM  [x] Physician  [x] Blood Products: [x] Yes, accepts [] No, needs counseling  [x] Initial BMI: 39.70   [x] Prenatal Labs: UTD  [x] Cervical Cancer Screening up to date- normal    [x] Rh status: negative   [x] Genetic Screening:  Declines NIPS/carrier screening   [x] NT US: (11-13 wks): WNL  [x] Baby ASA (if indicated): started    [x] Pregnancy dated by: LMP c/w in-office USN at 7 weeks     [x] Anatomy US: (19-20 wks): WNL  [] Federal Sterilization consent signed (if indicated):  [x] 1hr GCT at 24-28wks: WNL  [x] Rhogam: <given    [x] Fetal Surveillance: <> NSTs at 34 weeks   [x] Tdap: Declines   [x] Flu  Vaccine: Declines   [x] Updated COVID vaccine recommended - declines     [x] Breastfeeding: Plans to breast feed, has pump   [] Postpartum Birth control method:   [x] GBS at 36 - 37 wks:  [x] 39 weeks discussion of IOL vs. Expectant management: IOL  at 8:00  [x] Mode of delivery ( anticipated ):                    Subjective   28 yo  at 39.5 weeks presents with contractions very painful.  Was scheduled for iol today.  No leakage of fluid/bleeding.    Prenatal Provider Rainer      OB History    Para Term  AB Living   2 1 1 0 0 1   SAB IAB Ectopic Multiple Live Births   0 0 0 0 1      # Outcome Date GA Lbr Jose Luis/2nd Weight Sex Type Anes PTL Lv   2 Current            1 Term 22 40w0d  3.583 kg M Vag-Vacuum EPI N KRISTEN      Name: Chester       Surgical History[1]    Social History     Tobacco Use    Smoking status: Former     Types: Cigarettes    Smokeless tobacco: Never    Tobacco comments:     Smoked for a couple of years; nothing regularly   Substance Use Topics    Alcohol use: Not Currently       Allergies[2]    Prescriptions Prior to Admission[3]  Objective     Last Vitals  Temp Pulse Resp BP MAP O2 Sat     93       (!) 95 %     Blood Pressures    No data found in the last 1 encounters.          Physical Exam  General: NAD, mood appropriate  Cardiopulmonary: warm and well perfused, breathing comfortably on room air  Abdomen: Gravid, non-tender  Extremities: Symmetric  Speculum Exam: deferred  Cervix: 5  /100  /  -3    Chaperone Present: Yes.  Chaperone Name/Title: La Owens  Examination Chaperoned: Entire Physical Exam     Fetal Monitoring  Baseline: 140 bpm, intermittent as patient unable to stay still in bed  Bedside ultrasound: Yes    Labs in chart were reviewed.  CBC             Prenatal labs reviewed, not remarkable.             [1]   Past Surgical History:  Procedure Laterality Date    DENTAL SURGERY     [2] No Known Allergies  [3]   Medications Prior to Admission    Medication Sig Dispense Refill Last Dose/Taking    buPROPion XL (Wellbutrin XL) 150 mg 24 hr tablet Take 1 tablet (150 mg) by mouth once daily. 90 tablet 3     escitalopram (Lexapro) 10 mg tablet Take 1 tablet (10 mg) by mouth once daily. 90 tablet 3     ferrous sulfate (Slow Fe) 137 mg (45 mg iron) tablet extended release Take 1 tablet (45 mg of iron) by mouth once daily. 30 tablet 11     levothyroxine (Synthroid, Levoxyl) 125 mcg tablet Take 1 tablet (125 mcg) by mouth early in the morning.. Take on an empty stomach at the same time each day, either 30 to 60 minutes prior to breakfast 30 tablet 11     -iron fum-folic acid (Prenatal 19) 29 mg iron- 1 mg tablet Take by mouth.       Slow Release Iron 140 mg (45 mg iron) ER tablet Take 1 tablet (45 mg) by mouth early in the morning..

## 2025-07-22 NOTE — ANESTHESIA PROCEDURE NOTES
Epidural Block    Patient location during procedure: OB  Start time: 7/22/2025 6:09 AM  End time: 7/22/2025 6:35 AM  Reason for block: labor analgesia  Staffing  Performed: CRNA   Authorized by: ALCON Kasper DNP    Performed by: ALCON Kasper DNP    Preanesthetic Checklist  Completed: patient identified, IV checked, risks and benefits discussed, surgical consent, pre-op evaluation, timeout performed and sterile techniques followed  Block Timeout  RN/Licensed healthcare professional reads aloud to the Anesthesia provider and entire team: Patient identity, procedure with side and site, patient position, and as applicable the availability of implants/special equipment/special requirements.  Patient on coagulant treatment: no  Timeout performed at: 7/22/2025 6:11 AM  Block Placement  Patient position: sitting  Prep: ChloraPrep  Sterility prep: cap, drape, gloves, hand and mask  Sedation level: no sedation  Patient monitoring: continuous pulse oximetry and blood pressure  Approach: midline  Local numbing: lidocaine 1% to skin and subcutaneous tissues  Vertebral space: lumbar  Lumbar location: L3-L4  Epidural  Loss of resistance technique: saline  Guidance: landmark technique        Needle  Needle type: Tuohy   Needle gauge: 17

## 2025-07-22 NOTE — SIGNIFICANT EVENT
Late entry:  After CSE pt complete, recurrent FHR decels to 80 BPM.  SVE 10/100/+1 ROP.  No descent with pushing.  Tried manual rotation several times no further descent. . After 30 + minutes of pushing and recurrent late decels recommended going to OR to push further and consider  section.  In OR patient was not able to move then head at all with pushing.  Station remained +1, and still recurrent late decels with moderate variability.  Verbally consented patient for urgent  delivery, discussed risks of bleeding, Infection, organ injury.

## 2025-07-22 NOTE — ANESTHESIA PREPROCEDURE EVALUATION
Patient: Yesy Orozco    Evaluation Method: In-person visit    Procedure Information    Date: 25  Procedure: Labor Consult     Pt  @ 39.5 weeks, admitted in labor    Relevant Problems   Anesthesia (within normal limits)      Cardiac (within normal limits)      Pulmonary (within normal limits)      Neuro   (+) Generalized anxiety disorder      GI (within normal limits)      /Renal (within normal limits)      Liver (within normal limits)      Endocrine   (+) Hypothyroidism   (+) Obesity in pregnancy ([facility])      Hematology   (+) Antepartum anemia   (+) Thrombocytopenia      GYN   (+) 39 weeks gestation of pregnancy ([facility])       Clinical information reviewed:   Tobacco  Allergies  Meds   Med Hx  Surg Hx   Fam Hx          NPO Detail:  No data recorded     OB/Gyn Evaluation    Present Pregnancy    Patient is pregnant now.   Obstetric History                PHYSICAL EXAM    Anesthesia Plan    History of general anesthesia?: yes  History of complications of general anesthesia?: no    (I informed and discussed the risks and benefits of general, spinal and epidural anesthesia with the patient.  The patient expressed her understanding and her questions were answered.  A verbal consent was given by the patient.   )  Anesthetic plan and risks discussed with patient.

## 2025-07-22 NOTE — LACTATION NOTE
"Lactation Consultant Note  Lactation Consultation  Reason for Consult: Initial assessment  Consultant Name: Marina Viveros    Maternal Information  Has mother  before?: Yes  How long did the mother previously breastfeed?: Older child age 2.5,  for 2 years  Previous Maternal Breastfeeding Challenges: None  Infant to breast within first 2 hours of birth?: Yes  Exclusive Pump and Bottle Feed: No    Maternal Assessment  Breast Assessment:  (Assessment deferred, infant sleeping)  Nipple Assessment:  (Assessment deferred, infant sleeping)  Alterations in Nipple Condition:  (Assessment deferred, infant sleeping)  [place] Assessment:  (Assessment deferred, infant sleeping)    Infant Assessment  Infant Behavior: Deep sleep  Infant Assessment: Sublingual frenulum (comment) (Frenulum attaches close to apex of tongue. Limited elevation. Snap back with sucking.)    Feeding Assessment  Nutrition Source: Breastmilk  Feeding Method: Nursing at the breast  Unable to assess infant feeding at this time: Maternal request (Assessment deferred, infant sleeping)  Feeding Position:  (Assessment deferred, infant sleeping)  Suck/Feeding:  (Assessment deferred, infant sleeping)  Latch Assessment:  (Assessment deferred, infant sleeping)    LATCH TOOL       Breast Pump  Pump: Hand expression  Frequency: Other (comment) (As needed for poor feeds)    Other OB Lactation Tools       Patient Follow-up  Inpatient Lactation Follow-up Needed : Yes  Outpatient Lactation Follow-up: Recommended    Other OB Lactation Documentation  Maternal Risk Factors:  delivery, Obesity (pre-pregnancy BMI >30), Hypothyroidism (generalized anxiety disorder)  Infant Risk Factors: Poor or painful latch / restricted feedings    Recommendations/Summary  , 39.5 weeks, Rc/s on @0758. Birthweight 3520g. Mother endorses experience, stopped breastfeeding 3y/o \"a few months ago\". Endorses infant latched, some popping on and off, denies pain. Infant " content after feed at time of consult. Oral exam done, lingual frenulum attaches close to apex of tongue, limited elevation and extension, snap back noted with sucking. Discussed with Dr. Logan, ENT consult placed for frenotomy.     Plan:  Cue based feeding, at least 8-12 times in 24 hours  Frequent skin to skin  Hand express for extra volume  Call for assistance as needed  ENT consult for possible frenotomy

## 2025-07-22 NOTE — L&D DELIVERY NOTE
Birth Operative Report    Patient Name: Yesy Orozco  : 1995  MRN: [id number]  Age: 29 y.o.    /Para:   Gestational Age: 39w5d    Date of Surgery: 2025    Operating Room Location: * No operating room entered *    Pre-op Diagnosis:  Intrauterine Pregnancy at 39w5d  Non reassuring fetal heart tracing    Post-op Diagnosis:  Same, postpartum hemorrhage more due to hysterotomy than atony    Procedure:   Repeat Low Transverse  Section and Primary Low Transverse  Section    Surgery Category at [facility] Time: Confirmed Urgent    Surgeon:    * Viry Lopez V - Primary    Resident/Fellow/Other Assistant: Elizabeth De Los Santos  Surgeons and Role:     * Jewels Spear MD - Assisting    Anesthesia:  Type: CSE     CRNA: HÉCTOR Kasper-BIPIN, DNP; ALCON Toro    Surgical Staff:  Scrub Person: Korinne Kathryn Becks, RN  RNFA: MADISON Dang     Preoperative Antibiotics: Ancef 2 g and Azithromycin 500 mg    Indication for Procedure:   29 y.o.  at 39w5d  presented 9 cm very uncomfortable.  Received CSE.  Had recurrent late decelerations.  Baby LOP +1, unable to rotate or have further descent.      Informed Consent:  The risks, benefits, complications, and alternatives were discussed with the patient. The patient understood that the risks of  section include but are not limited to infection, bleeding, injury to nearby structures or organs, possible need for transfusion, and potential need for more surgery. The patient stated understanding and desired to proceed. All questions were answered. The site of surgery was properly noted and marked. The patient's identity was confirmed, and the procedure verified as a  delivery. A Time Out was held and the above information confirmed.     Findings:   Normal appearing gravid uterus, fallopian tubes, and ovaries. Amniotic fluid meconium upon entry into uterus, Male infant in Vertex Occiput  Anterior presentation, APGARS 7 , 8 .  Birth Weight 3.52 kg.    Description of Procedure:   Patient was taken to the OR where regional anesthesia was found to be adequate.  She was then placed in the dorsal supine position with a left lateral tilt. A dale catheter was placed, SCDs were applied, and a vaginal prep was performed. A pre-procedure time out was performed. The patient was given a prophylactic dose of IV antibiotics.  She was then prepped and draped in the usual sterile fashion.     A Pfannenstiel skin incision was made with the scalpel through the skin and subcutaneous fat to the underlying fascial layer. The fascia was incised in the midline with the scalpel and the incision was extended bilaterally. The superior aspect of the incision was grasped, tented up with Kocher clamps and the rectus muscle was dissected off. The muscles were divided in the midline, the peritoneum was then identified and entered bluntly and incision extended superiorly and inferiorly taking care to avoid underlying viscera.  The bladder blade was inserted.       Uterine Incision was made with the scalpel, the uterine cavity was entered, and the hysterotomy was extended cephalocaudally by stretching. The infant was delivered atraumatically, the cord was clamped and cut and infant was handed off to awaiting nursing.  A cord segment and blood sample were collected. The placenta was then manually removed. The uterus was cleared of all clot and debris. The uterine incision was repaired using a running locked stitch of 0-monocryl in one layer.   There was extension on the left to the ascending branch of the uterine artery.  It was controlled with suture.        The urine was noted to be bloody and the bladder was backfilled with 200 mL of formula without extravasation.  There was still a small amount of serosal oozing on the left and Floseal was placed.  The peritoneum and muscles were hemostatic.   The fascia was closed using a  running stitch of 0-PDS.  The subcutaneous layer was irrigated, small bleeders were cauterized. The subcutaneous layer was re-approximated using a running stitch of 2-0 monocryl. The skin was closed with 4-0 stratafix.         * Viry Lopez V - Primary was present for key portions of the procedure.    Additional Procedures:  None    Task performed by: N/A    Complications:      Quantitative Blood Loss:   Delivery Blood Loss: 1317 mL (7/22/2025  7:56 AM - 7/22/2025 12:10 PM)    Intraprocedure I/O Totals       None             Blood products: none   Blood Product Administration History       None            Uterotonics/Hemostatic Agent: IV Pitocin 30 units, IM Methergine 0.2 mg, and buccal misoprostol and IV TXA.    Specimen:   Placenta  Delivered: 7/22/2025  7:59 AM  Appearance: Intact  Removal: Manual removal    Disposition: pathology    Sponge/Instrument/Needle Counts: The sponge, lap and needle counts were correct.    Patient Disposition: Patient recovering on labor and delivery in stable condition.    Brian Orozco [[id number]]      Labor Events    Rupture date/time: 7/22/2025 0600  Rupture type: Artificial  Fluid color: Clear  Fluid odor: None  Labor type: Spontaneous Onset of Labor  Labor allowed to proceed with plans for an attempted vaginal birth?: Yes  Augmentation: None  Complications: Fetal Intolerance       Labor Length    3rd stage: 0h 01m       Placenta    Placenta delivery date/time: 7/22/2025 07:59  Placenta removal: Manual removal  Placenta appearance: Intact  Placenta disposition: pathology       Cord    Vessels: 3 vessels  Complications: None  Delayed cord clamping?: Yes  Cord blood disposition: Lab  Gases sent?: Yes  Stem cell collection (by provider): No       Lacerations    Episiotomy: None  Perineal laceration: None  Other lacerations?: No  Repair suture: None       Operative Delivery    Forceps attempted?: No  Vacuum extractor attempted?: No       Shoulder Dystocia    Shoulder  dystocia present?: No       [place] Delivery    Birth date/time: 2025 07:58:00  Delivery type: , Low Transverse   categorization: primary   priority: urgent  Indications for : Fetal Intolerance of Labor  Complications: Fetal Intolerance       Resuscitation    Method: Suctioning, Tactile stimulation       Apgars    Living status: Living  Apgar Component Scores:  1 min.:  5 min.:  10 min.:  15 min.:  20 min.:    Skin color:  0  1       Heart rate:  2  2       Reflex irritability:  2  2       Muscle tone:  1  1       Respiratory effort:  2  2       Total:  7  8       Apgars assigned by: EVERETT       Delivery Providers    Delivering clinician: Viry GEE MD   Provider Role    Lisa Gallegos RN Delivery Nurse    Maria Teresa Griffin RN Nursery Nurse     Resident

## 2025-07-22 NOTE — ANESTHESIA PROCEDURE NOTES
CSE Block    Patient location during procedure: OB  Start time: 7/22/2025 6:09 AM  End time: 7/22/2025 6:35 AM  Reason for block: primary anesthetic}  Staffing  Performed: CRNA   Authorized by: ALCON Kasper DNP    Performed by: ALCON Kasper DNP    Preanesthetic Checklist  Completed: patient identified, IV checked, risks and benefits discussed, surgical consent, monitors and equipment checked, pre-op evaluation, timeout performed and sterile techniques followed  Block Timeout  RN/Licensed healthcare professional reads aloud to the Anesthesia provider and entire team: Patient identity, procedure with side and site, patient position, and as applicable the availability of implants/special equipment/special requirements.  Patient on coagulant treatment: no  Timeout performed at: 7/22/2025 6:11 AM    CSE Block  Patient position: sitting  Prep: ChloraPrep  Sterility prep: cap, drape, gloves, hand and mask  Sedation level: no sedation  Patient monitoring: continuous pulse oximetry and blood pressure  Approach: midline  Local numbing: lidocaine 1% to skin and subcutaneous tissues  Vertebral space: lumbar  L3-4  Guidance: landmark technique    Epidural Needle  NAKUL technique: saline  Needle type: Tuohy   Needle gauge: 17 G  Needle insertion depth: 7 cm  Spinal Needle  Needle type: pencil-point   Needle gauge: 25 G  Free flow CSF: yes  Epidural Catheter  Catheter type: multi-orifice  Catheter at skin depth: 12 cm  Catheter securement method: clear occlusive dressing, liquid medical adhesive and surgical tape    Test dose given at 7/22/2025 6:33 AM  Test dose: lidocaine 1.5% with epinephrine 1-to-200,000  Test dose result: no positive test dose            Assessment  Events: no positive test dose  Procedure assessment: patient tolerated procedure well with no immediate complications  Additional Notes  Pt extremely uncomfortable with contractions. Having a very difficult time sitting. Epidural  space achieved on first pass. Spinal needle placed with free flow CSF and medication injected. 0635: Pt feeling relief, preparing to push.

## 2025-07-22 NOTE — ANESTHESIA PREPROCEDURE EVALUATION
Patient: Yesy Orozco    Evaluation Method: In-person visit    Procedure Information    Date: 25  Procedure: Labor Consult     Pt  @ 39.5 weeks, admitted in labor    Relevant Problems   Anesthesia (within normal limits)      Cardiac (within normal limits)      Pulmonary (within normal limits)      Neuro   (+) Generalized anxiety disorder      GI (within normal limits)      /Renal (within normal limits)      Liver (within normal limits)      Endocrine   (+) Hypothyroidism   (+) Obesity in pregnancy ([facility])      Hematology   (+) Antepartum anemia   (+) Thrombocytopenia      GYN   (+) 39 weeks gestation of pregnancy ([facility])       Clinical information reviewed:   Tobacco  Allergies  Meds   Med Hx  Surg Hx   Fam Hx          NPO Detail:  No data recorded     OB/Gyn Evaluation    Present Pregnancy    Patient is pregnant now.   Obstetric History                PHYSICAL EXAM    Anesthesia Plan    History of general anesthesia?: yes  History of complications of general anesthesia?: no    ASA 3     CSE   (I informed and discussed the risks and benefits of general, spinal and epidural anesthesia with the patient.  The patient expressed her understanding and her questions were answered.  A verbal consent was given by the patient.   )  Anesthetic plan and risks discussed with patient.  Use of blood products discussed with patient who consented to blood products.

## 2025-07-22 NOTE — CARE PLAN
The patient's goals for the shift include to have a tolerable pain level    The clinical goals for the shift include to maintain stable blood loss    Over the shift, the patient did make progress toward the following goals.       Problem: Pain - Adult  Goal: Verbalizes/displays adequate comfort level or baseline comfort level  7/22/2025 1805 by Marina Fiore RN  Outcome: Adequate for Discharge  7/22/2025 1804 by Marina Fiore RN  Flowsheets (Taken 7/22/2025 1804)  Verbalizes/displays adequate comfort level or baseline comfort level:   Encourage patient to monitor pain and request assistance   Assess pain using appropriate pain scale   Administer analgesics based on type and severity of pain and evaluate response   Implement non-pharmacological measures as appropriate and evaluate response   Notify Licensed Independent Practitioner if interventions unsuccessful or patient reports new pain     Problem: Safety - Adult  Goal: Free from fall injury  7/22/2025 1805 by Marina Fiore RN  Outcome: Adequate for Discharge  7/22/2025 1804 by Marina Fiore RN  Flowsheets (Taken 7/22/2025 1804)  Free from fall injury:   Instruct family/caregiver on patient safety   Based on caregiver fall risk screen, instruct family/caregiver to ask for assistance with transferring infant if caregiver noted to have fall risk factors     Problem: Discharge Planning  Goal: Discharge to home or other facility with appropriate resources  7/22/2025 1805 by Marina Fiore RN  Outcome: Adequate for Discharge  7/22/2025 1804 by Marina Fiore RN  Flowsheets (Taken 7/22/2025 1804)  Discharge to home or other facility with appropriate resources:   Identify barriers to discharge with patient and caregiver   Arrange for needed discharge resources and transportation as appropriate   Identify discharge learning needs (meds, wound care, etc)     Problem: Postpartum  Goal: Experiences normal postpartum course  7/22/2025 1805 by Marina Fiore  RN  Outcome: Adequate for Discharge  2025 by Marina Fiore RN  Flowsheets (Taken 2025 180)  Experiences normal postpartum course:   Monitor maternal vital signs   Assess uterine involution   Med administration/monitoring of effect   Fundal and lochia asssessments w/fundal massage, bladder emptying   Assist with identification of coping methods and supprot resources  Goal: Appropriate maternal -  bonding  2025 by Marina Fiore RN  Outcome: Adequate for Discharge  2025 by Marina Fiore RN  Flowsheets (Taken 2025 180)  Appropriate maternal- bonding:   Identify family support   24-hour rooming in   Referral to  or  as needed  Goal: Incisions, wounds, or drain sites healing without S/S of infection  2025 by Marina Fiore RN  Outcome: Adequate for Discharge  2025 by Marina Fiore RN  Flowsheets (Taken 2025 180)  Incisions, wounds, or drain sites healing without sign and symptoms of infection:   ADMISSION and DAILY: Assess and document risk factors for pressure ulcer development   TWICE DAILY: Assess and document skin integrity  Goal: No s/sx of hemorrhage  2025 by Marina Fiore RN  Outcome: Adequate for Discharge  2025 by Marina Fiore RN  Flowsheets (Taken 2025 180)  No s/sx of hemorrhage:   Monitor QBL and vital signs   Fundal + lochia assessments w/fundal massage, bladder emptying, intrauterine device when indicated

## 2025-07-23 ENCOUNTER — APPOINTMENT (OUTPATIENT)
Dept: OBSTETRICS AND GYNECOLOGY | Facility: CLINIC | Age: 30
End: 2025-07-23
Payer: COMMERCIAL

## 2025-07-23 LAB
ERYTHROCYTE [DISTWIDTH] IN BLOOD BY AUTOMATED COUNT: 15.3 % (ref 11.5–14.5)
HCT VFR BLD AUTO: 24.5 % (ref 36–46)
HGB BLD-MCNC: 8 G/DL (ref 12–16)
MCH RBC QN AUTO: 28.2 PG (ref 26–34)
MCHC RBC AUTO-ENTMCNC: 32.7 G/DL (ref 32–36)
MCV RBC AUTO: 86 FL (ref 80–100)
NRBC BLD-RTO: 0 /100 WBCS (ref 0–0)
PLATELET # BLD AUTO: 81 X10*3/UL (ref 150–450)
RBC # BLD AUTO: 2.84 X10*6/UL (ref 4–5.2)
WBC # BLD AUTO: 7.5 X10*3/UL (ref 4.4–11.3)

## 2025-07-23 PROCEDURE — 85027 COMPLETE CBC AUTOMATED: CPT | Performed by: OBSTETRICS & GYNECOLOGY

## 2025-07-23 PROCEDURE — 2500000004 HC RX 250 GENERAL PHARMACY W/ HCPCS (ALT 636 FOR OP/ED): Performed by: OBSTETRICS & GYNECOLOGY

## 2025-07-23 PROCEDURE — 1220000001 HC OB SEMI-PRIVATE ROOM DAILY

## 2025-07-23 PROCEDURE — 2500000001 HC RX 250 WO HCPCS SELF ADMINISTERED DRUGS (ALT 637 FOR MEDICARE OP): Performed by: OBSTETRICS & GYNECOLOGY

## 2025-07-23 PROCEDURE — 36415 COLL VENOUS BLD VENIPUNCTURE: CPT | Performed by: OBSTETRICS & GYNECOLOGY

## 2025-07-23 PROCEDURE — 2500000002 HC RX 250 W HCPCS SELF ADMINISTERED DRUGS (ALT 637 FOR MEDICARE OP, ALT 636 FOR OP/ED): Performed by: OBSTETRICS & GYNECOLOGY

## 2025-07-23 RX ORDER — ESCITALOPRAM OXALATE 10 MG/1
10 TABLET ORAL DAILY
Status: DISCONTINUED | OUTPATIENT
Start: 2025-07-23 | End: 2025-07-24 | Stop reason: HOSPADM

## 2025-07-23 RX ORDER — BUPROPION HYDROCHLORIDE 150 MG/1
150 TABLET ORAL DAILY
Status: DISCONTINUED | OUTPATIENT
Start: 2025-07-23 | End: 2025-07-24 | Stop reason: HOSPADM

## 2025-07-23 RX ORDER — DIPHENHYDRAMINE HYDROCHLORIDE 50 MG/ML
50 INJECTION, SOLUTION INTRAMUSCULAR; INTRAVENOUS EVERY 5 MIN PRN
Status: DISCONTINUED | OUTPATIENT
Start: 2025-07-23 | End: 2025-07-24 | Stop reason: HOSPADM

## 2025-07-23 RX ADMIN — ACETAMINOPHEN 975 MG: 325 TABLET ORAL at 20:12

## 2025-07-23 RX ADMIN — KETOROLAC TROMETHAMINE 30 MG: 30 INJECTION, SOLUTION INTRAMUSCULAR at 01:49

## 2025-07-23 RX ADMIN — OXYCODONE HYDROCHLORIDE 5 MG: 5 TABLET ORAL at 09:56

## 2025-07-23 RX ADMIN — IBUPROFEN 600 MG: 600 TABLET ORAL at 07:31

## 2025-07-23 RX ADMIN — POLYETHYLENE GLYCOL 3350 17 G: 17 POWDER, FOR SOLUTION ORAL at 09:36

## 2025-07-23 RX ADMIN — IBUPROFEN 600 MG: 600 TABLET ORAL at 13:57

## 2025-07-23 RX ADMIN — ACETAMINOPHEN 975 MG: 325 TABLET ORAL at 01:49

## 2025-07-23 RX ADMIN — IBUPROFEN 600 MG: 600 TABLET ORAL at 20:12

## 2025-07-23 RX ADMIN — ENOXAPARIN SODIUM 60 MG: 60 INJECTION SUBCUTANEOUS at 09:28

## 2025-07-23 RX ADMIN — POLYETHYLENE GLYCOL 3350 17 G: 17 POWDER, FOR SOLUTION ORAL at 19:09

## 2025-07-23 RX ADMIN — ACETAMINOPHEN 975 MG: 325 TABLET ORAL at 13:57

## 2025-07-23 RX ADMIN — BUPROPION HYDROCHLORIDE 150 MG: 150 TABLET, EXTENDED RELEASE ORAL at 13:51

## 2025-07-23 RX ADMIN — LEVOTHYROXINE SODIUM 125 MCG: 0.1 TABLET ORAL at 13:52

## 2025-07-23 RX ADMIN — IRON SUCROSE 300 MG: 20 INJECTION, SOLUTION INTRAVENOUS at 13:59

## 2025-07-23 RX ADMIN — ESCITALOPRAM OXALATE 10 MG: 10 TABLET ORAL at 13:52

## 2025-07-23 RX ADMIN — ACETAMINOPHEN 975 MG: 325 TABLET ORAL at 07:32

## 2025-07-23 RX ADMIN — OXYCODONE HYDROCHLORIDE 5 MG: 5 TABLET ORAL at 19:10

## 2025-07-23 ASSESSMENT — PAIN SCALES - GENERAL
PAINLEVEL_OUTOF10: 4
PAIN_LEVEL: 0
PAINLEVEL_OUTOF10: 3
PAINLEVEL_OUTOF10: 5 - MODERATE PAIN
PAINLEVEL_OUTOF10: 6

## 2025-07-23 ASSESSMENT — PAIN DESCRIPTION - DESCRIPTORS
DESCRIPTORS: ACHING
DESCRIPTORS: CRAMPING;SORE
DESCRIPTORS: SORE
DESCRIPTORS: SORE

## 2025-07-23 NOTE — PROGRESS NOTES
Postpartum Progress Note    Assessment/Plan   Yesy Orozco is a 29 y.o., , who delivered at 39w5d gestation and is now postpartum day 1.    PPD #1 s/p  for non-reassuring fetal heart rate tracing  -pain controlled well, 1 oxycodone given this morning at 10 AM  -Lovenox for postpartum DVT prophylaxis  -Rh neg, baby is also Rh negative, Rhogam not indicated  - rubella immune  -breastfeeding, no concerns  -circumcision for male infant done this morning      Acute blood loss anemia  -Hg 12 pre-op to Hg 8.0. Pt is asymptomatic, vital signs stable, prefers to avoid transfusion  -Venofer ordered inpatient    Subjective   Her pain is well controlled with current medications  She is passing flatus  She is ambulating well  She is tolerating a Adult diet Regular  She reports no breast or nursing problems  She denies emotional concerns today   Her plan for contraception is none         Objective     Last Vitals:  Temp Pulse Resp BP MAP Pulse Ox   36.4 °C (97.5 °F) 80 14 102/68 80 100 %     Vitals Min/Max Last 24 Hours:  Temp  Min: 36.4 °C (97.5 °F)  Max: 37.4 °C (99.3 °F)  Pulse  Min: 62  Max: 92  Resp  Min: 14  Max: 18  BP  Min: 98/55  Max: 116/77  MAP (mmHg)  Min: 73  Max: 90    Intake/Output:     Intake/Output Summary (Last 24 hours) at 2025 1106  Last data filed at 2025 0400  Gross per 24 hour   Intake --   Output 1550 ml   Net -1550 ml       Physical Exam:  General: Examination reveals a well developed, well nourished, female, in no acute distress. She is alert and cooperative.  Abdomen: soft, gravid, nontender, nondistended, no abnormal masses, no epigastric pain.  Incision: healing well, no drainage, no erythema, no hernia.  Extremities: no redness or tenderness in the calves or thighs, no edema.      Lab Data:  Lab Results   Component Value Date    WBC 7.5 2025    HGB 8.0 (L) 2025    HCT 24.5 (L) 2025    PLT 81 (L) 2025

## 2025-07-23 NOTE — CARE PLAN
The patient's goals for the shift include to nbond with baby    The clinical goals for the shift include to remain hemodynamically stable    Over the shift, the patient did   Problem: Postpartum  Goal: Experiences normal postpartum course  Outcome: Progressing  Flowsheets (Taken 2025)  Experiences normal postpartum course:   Monitor maternal vital signs   Med administration/monitoring of effect   Assist with identification of coping methods and supprot resources   Assess uterine involution   Fundal and lochia asssessments w/fundal massage, bladder emptying  Goal: Appropriate maternal -  bonding  Outcome: Progressing  Flowsheets (Taken 2025)  Appropriate maternal- bonding:   Identify family support   Referral to  or  as needed   Encourage NICU visitation if infant is in NICU   Provide information about infant status if    Assist with identification of coping methods and support resources   Encourage Mom to interact with staff if visitation is not possible  Goal: Establish and maintain infant feeding pattern for adequate nutrition  Outcome: Progressing  Flowsheets (Taken 2025)  Establish and maintain infant feeding pattern for adequate nutrition:   Assess  human milk feeding   Refer to lactation consultant as needed   Assess formula feeding   Encourage feeding and/or pumping at least 8x/day  Goal: Incisions, wounds, or drain sites healing without S/S of infection  Outcome: Progressing  Flowsheets (Taken 2025 by Marina Fiore RN)  Incisions, wounds, or drain sites healing without sign and symptoms of infection:   ADMISSION and DAILY: Assess and document risk factors for pressure ulcer development   TWICE DAILY: Assess and document skin integrity  Goal: No s/sx infection  Outcome: Progressing  Flowsheets (Taken 2025)  No s/sx of infection:   Hygiene practices/nasrin-care   Monitor QBL and vital signs   Fundal + lochia  assessments w/fundal massage, bladder emptying, intrauterine device when indicated  Goal: No s/sx of hemorrhage  Outcome: Progressing  Flowsheets (Taken 7/22/2025 1804 by Marina Fiore RN)  No s/sx of hemorrhage:   Monitor QBL and vital signs   Fundal + lochia assessments w/fundal massage, bladder emptying, intrauterine device when indicated  Goal: Minimal s/sx of HDP and BP<160/110  Outcome: Progressing  Flowsheets (Taken 7/23/2025 1810)  Minimal s/sx of HDP and BP <160/110:   Med administration/monitoring of effect   Monitor QBL and vital signs    make progress toward the following goals.

## 2025-07-24 VITALS
RESPIRATION RATE: 18 BRPM | TEMPERATURE: 98 F | DIASTOLIC BLOOD PRESSURE: 68 MMHG | OXYGEN SATURATION: 98 % | BODY MASS INDEX: 43.29 KG/M2 | HEART RATE: 82 BPM | HEIGHT: 61 IN | WEIGHT: 229.28 LBS | SYSTOLIC BLOOD PRESSURE: 108 MMHG

## 2025-07-24 LAB
ERYTHROCYTE [DISTWIDTH] IN BLOOD BY AUTOMATED COUNT: 15.5 % (ref 11.5–14.5)
HCT VFR BLD AUTO: 24.7 % (ref 36–46)
HGB BLD-MCNC: 7.9 G/DL (ref 12–16)
MCH RBC QN AUTO: 28.1 PG (ref 26–34)
MCHC RBC AUTO-ENTMCNC: 32 G/DL (ref 32–36)
MCV RBC AUTO: 88 FL (ref 80–100)
NRBC BLD-RTO: 0 /100 WBCS (ref 0–0)
PLATELET # BLD AUTO: 92 X10*3/UL (ref 150–450)
RBC # BLD AUTO: 2.81 X10*6/UL (ref 4–5.2)
WBC # BLD AUTO: 6.1 X10*3/UL (ref 4.4–11.3)

## 2025-07-24 PROCEDURE — 2500000001 HC RX 250 WO HCPCS SELF ADMINISTERED DRUGS (ALT 637 FOR MEDICARE OP): Performed by: OBSTETRICS & GYNECOLOGY

## 2025-07-24 PROCEDURE — 2500000002 HC RX 250 W HCPCS SELF ADMINISTERED DRUGS (ALT 637 FOR MEDICARE OP, ALT 636 FOR OP/ED): Performed by: OBSTETRICS & GYNECOLOGY

## 2025-07-24 PROCEDURE — 36415 COLL VENOUS BLD VENIPUNCTURE: CPT | Performed by: OBSTETRICS & GYNECOLOGY

## 2025-07-24 PROCEDURE — 85027 COMPLETE CBC AUTOMATED: CPT | Performed by: OBSTETRICS & GYNECOLOGY

## 2025-07-24 RX ORDER — POLYETHYLENE GLYCOL 3350 17 G/17G
17 POWDER, FOR SOLUTION ORAL DAILY
COMMUNITY
Start: 2025-07-24

## 2025-07-24 RX ORDER — OXYCODONE HYDROCHLORIDE 5 MG/1
5 TABLET ORAL EVERY 6 HOURS PRN
Qty: 10 TABLET | Refills: 0 | Status: SHIPPED | OUTPATIENT
Start: 2025-07-24

## 2025-07-24 RX ORDER — ACETAMINOPHEN 500 MG
1000 TABLET ORAL EVERY 6 HOURS PRN
COMMUNITY
Start: 2025-07-24

## 2025-07-24 RX ORDER — IBUPROFEN 600 MG/1
600 TABLET, FILM COATED ORAL EVERY 6 HOURS PRN
COMMUNITY
Start: 2025-07-24

## 2025-07-24 RX ADMIN — ACETAMINOPHEN 975 MG: 325 TABLET ORAL at 08:01

## 2025-07-24 RX ADMIN — ESCITALOPRAM OXALATE 10 MG: 10 TABLET ORAL at 09:06

## 2025-07-24 RX ADMIN — ACETAMINOPHEN 975 MG: 325 TABLET ORAL at 01:44

## 2025-07-24 RX ADMIN — LEVOTHYROXINE SODIUM 125 MCG: 0.1 TABLET ORAL at 05:52

## 2025-07-24 RX ADMIN — IBUPROFEN 600 MG: 600 TABLET ORAL at 01:44

## 2025-07-24 RX ADMIN — BUPROPION HYDROCHLORIDE 150 MG: 150 TABLET, EXTENDED RELEASE ORAL at 09:06

## 2025-07-24 RX ADMIN — IBUPROFEN 600 MG: 600 TABLET ORAL at 08:01

## 2025-07-24 ASSESSMENT — PAIN SCALES - GENERAL: PAINLEVEL_OUTOF10: 5 - MODERATE PAIN

## 2025-07-24 NOTE — ANESTHESIA POSTPROCEDURE EVALUATION
Patient: Yesy Orozco    Procedure Summary       Date: 25 Room / Location: [facility] ST OB    Anesthesia Start: 609 Anesthesia Stop: 920    Procedure:  DELIVERY Diagnosis: (Fetal Intolerance of Labor)    Surgeons: Viry GEE MD Responsible Provider: ALCON Toro    Anesthesia Type: epidural ASA Status: 3            Anesthesia Type: epidural    Vitals Value Taken Time   /52 25 11:04   Temp 36.8 °C (98.2 °F) 25 09:59   Pulse 71 25 11:14   Resp 18 25 10:14   SpO2 100 % 25 11:14       Anesthesia Post Evaluation    Patient location during evaluation: bedside  Patient participation: complete - patient participated  Level of consciousness: awake and alert  Pain score: 0  Pain management: adequate  Multimodal analgesia pain management approach  Airway patency: patent  Cardiovascular status: acceptable  Respiratory status: acceptable  Hydration status: acceptable  Postoperative Nausea and Vomiting: none        No notable events documented.

## 2025-07-24 NOTE — CARE PLAN
The patient's goals for the shift include discharge home    The clinical goals for the shift include pain well controlled      Problem: Postpartum  Goal: Experiences normal postpartum course  Outcome: Met  Flowsheets (Taken 2025 by Rose Marie Valencia, RN)  Experiences normal postpartum course:   Monitor maternal vital signs   Med administration/monitoring of effect   Assist with identification of coping methods and supprot resources   Assess uterine involution   Fundal and lochia asssessments w/fundal massage, bladder emptying  Goal: Appropriate maternal -  bonding  Outcome: Met  Goal: Establish and maintain infant feeding pattern for adequate nutrition  Outcome: Met  Goal: Incisions, wounds, or drain sites healing without S/S of infection  Outcome: Met  Flowsheets (Taken 2025 by Marina Fiore RN)  Incisions, wounds, or drain sites healing without sign and symptoms of infection:   ADMISSION and DAILY: Assess and document risk factors for pressure ulcer development   TWICE DAILY: Assess and document skin integrity  Goal: No s/sx infection  Outcome: Met  Goal: No s/sx of hemorrhage  Outcome: Met  Goal: Minimal s/sx of HDP and BP<160/110  Outcome: Met  Flowsheets (Taken 2025 by Rose Marie Valencia RN)  Minimal s/sx of HDP and BP <160/110:   Med administration/monitoring of effect   Monitor QBL and vital signs

## 2025-07-24 NOTE — LACTATION NOTE
Lactation Consultant Note  Lactation Consultation  Reason for Consult: Follow-up assessment  Consultant Name: Beata Lai RN IBCLC    Maternal Information       Maternal Assessment       Infant Assessment       Feeding Assessment       LATCH TOOL       Breast Pump       Other OB Lactation Tools       Patient Follow-up       Other OB Lactation Documentation       Recommendations/Summary  RN IBCLC on unit earlier in shift and informed family does not want consult at this time and will call if desired.

## 2025-07-24 NOTE — PROGRESS NOTES
Postpartum Progress Note    Assessment/Plan   Yesy Orozco is a 29 y.o., , who delivered at 39w5d gestation by pLTCS for NRFHT in the second stage and is now POD#2    s/p CS  -Pain well controlled on current regimen, is requiring oxycodone  -Afebrile, ambulating, passing flatus  -Tolerating regular diet with anti-emetics PRN and bowel regimen ordered  -Voiding spontaneously  -Admission hgb 12 --> EBL 1317cc --> POD#1 hgb 8.0 -> POD#2 hgb 7.9; Asymptomatic. Received a single dose of IV iron venofer and has since lost IV access. For PO iron at discharge   -Rh negative, baby also Rh negative, Rhogam not indicated   -Rubella immune  -Lactation consultant PRN    Gestational Thrombocytopenia  -Platelets 114 at 34 weeks  -Platelets 133 on admission -> 81 -> 92  -Normotensive, not meeting criteria for HDP  -Lovenox held  -To repeat CBC at 6 weeks postpartum    Anxiety/Depression  -Home lexapro/Wellbutrin continued    Hypothyroid  -Home levothyroxine continued    DVT prophylaxis  -VTE risk score = 6, lovenox held in the setting of thrombocytopenia  -SCDs, ambulation    Dispo: meeting all postop milestones and strongly desires discharge home today. For incision check in 1 week    Ann Marie Thornton MD     Subjective   Pain well-controlled on current regimen. Is using oxycodone PRN. Ambulating, tolerating PO, voiding, and passing flatus. Awaiting BM. Lochia light. Denies lightheadedness, dyspnea on exertion, or palpitations. Breast feeding and baby doing well.    Objective     Last Vitals:  Temp Pulse Resp BP MAP Pulse Ox   36.7 °C (98 °F) 82 18 108/68 79 98 %     Vitals Min/Max Last 24 Hours:  Temp  Min: 36.6 °C (97.9 °F)  Max: 36.8 °C (98.2 °F)  Pulse  Min: 71  Max: 87  Resp  Min: 16  Max: 18  BP  Min: 102/67  Max: 110/70  MAP (mmHg)  Min: 79  Max: 84    Intake/Output:   No intake or output data in the 24 hours ending 25 1005    Physical Exam:  Gen: awake, alert  Head: NCAT  HEENT: moist mucus membranes  Pulm:  breathing comfortably on room air  CV: warm and well-perfused  Abd: soft, appropriately tender to palpation. Mepilex dressing in place but edges pealing off. Removed and replaced. Pfannenstiel incision c/d/i  Neuro: alert and oriented  Psych: appropriate affect     Lab Data:  Labs in chart were reviewed.

## 2025-07-24 NOTE — DISCHARGE SUMMARY
Discharge Summary    Admission Date: 2025  Discharge Date: 25     Discharge Diagnosis  39 weeks gestation of pregnancy ([facility])    Hospital Course  Delivery Date: 2025 7:58 AM  Delivery type: , Low Transverse   GA at delivery: 39w5d  Outcome: Living  Anesthesia during delivery:    Intrapartum complications: Fetal Intolerance  Feeding method: Breastfeeding Status: Yes     Patient presented in labor at term. On , she underwent pLTCS for NRFHT in the second stage. She had a 1300cc EBL and hgb equilibrated to 7.9. She was asymptomatic from an anemia standpoint and received a dose of IV iron sucrose. She was continued on PO iron at discharge. She has known gestational thrombocytopenia. Platelet julio cesar at 80 on PPD#1 in the setting of PPH. Platelets improved to 92 at discharge. To repeat CBC at 6 weeks postpartum to ensure resolution of gestational thrombocytopenia. By POD#2 she was meeting all milestones and desired discharge home. She will follow-up in the office in 1 week for an incision check.    Pertinent Physical Exam At Time of Discharge  See progress note from day of discharge    Last Vitals:  Temp Pulse Resp BP MAP Pulse Ox   36.7 °C (98 °F) 82 18 108/68 79 98 %     Discharge Meds     Your medication list        START taking these medications        Instructions Last Dose Given Next Dose Due   acetaminophen 500 mg tablet  Commonly known as: Tylenol Extra Strength      Take 2 tablets (1,000 mg) by mouth every 6 hours if needed for mild pain (1 - 3).       ibuprofen 600 mg tablet      Take 1 tablet (600 mg) by mouth every 6 hours if needed for mild pain (1 - 3).       oxyCODONE 5 mg immediate release tablet  Commonly known as: Roxicodone      Take 1 tablet (5 mg) by mouth every 6 hours if needed for moderate pain (4 - 6) or severe pain (7 - 10).       polyethylene glycol 17 gram packet  Commonly known as: Glycolax, Miralax      Take 17 g by mouth once daily.              CHANGE how you  take these medications        Instructions Last Dose Given Next Dose Due   Slow Fe 137 mg (45 mg iron) tablet extended release  Generic drug: ferrous sulfate  What changed: Another medication with the same name was removed. Continue taking this medication, and follow the directions you see here.      Take 1 tablet (45 mg of iron) by mouth once daily.              CONTINUE taking these medications        Instructions Last Dose Given Next Dose Due   buPROPion  mg 24 hr tablet  Commonly known as: Wellbutrin XL      Take 1 tablet (150 mg) by mouth once daily.       escitalopram 10 mg tablet  Commonly known as: Lexapro      Take 1 tablet (10 mg) by mouth once daily.       levothyroxine 125 mcg tablet  Commonly known as: Synthroid, Levoxyl      Take 1 tablet (125 mcg) by mouth early in the morning.. Take on an empty stomach at the same time each day, either 30 to 60 minutes prior to breakfast       Prenatal 19 29 mg iron- 1 mg tablet  Generic drug: -iron fum-folic acid                     Where to Get Your Medications        These medications were sent to [id number] [address] [address] [address]      Phone: [phone]   oxyCODONE 5 mg immediate release tablet       You can get these medications from any pharmacy    You don't need a prescription for these medications  acetaminophen 500 mg tablet  ibuprofen 600 mg tablet  polyethylene glycol 17 gram packet          Complications Requiring Follow-Up  None    Test Results Pending At Discharge  Pending Labs       No current pending labs.            Outpatient Follow-Up  Future Appointments   Date Time Provider Department Center   7/30/2025  3:20 PM Marielena Jung MD [id number]       I spent 20 minutes in the professional and overall care of this patient.      Ann Marie Thornton MD

## 2025-07-26 LAB
BLOOD EXPIRATION DATE: NORMAL
DISPENSE STATUS: NORMAL
PRODUCT BLOOD TYPE: 600
PRODUCT CODE: NORMAL
UNIT ABO: NORMAL
UNIT NUMBER: NORMAL
UNIT RH: NORMAL
UNIT VOLUME: 350
XM INTEP: NORMAL

## 2025-07-30 ENCOUNTER — POSTPARTUM VISIT (OUTPATIENT)
Facility: CLINIC | Age: 30
End: 2025-07-30
Payer: COMMERCIAL

## 2025-07-30 ENCOUNTER — APPOINTMENT (OUTPATIENT)
Facility: CLINIC | Age: 30
End: 2025-07-30
Payer: COMMERCIAL

## 2025-07-30 VITALS
BODY MASS INDEX: 39.8 KG/M2 | HEIGHT: 61 IN | DIASTOLIC BLOOD PRESSURE: 71 MMHG | SYSTOLIC BLOOD PRESSURE: 109 MMHG | WEIGHT: 210.8 LBS

## 2025-07-30 DIAGNOSIS — Z09 POSTOP CHECK: Primary | ICD-10-CM

## 2025-07-30 PROBLEM — O99.210 OBESITY IN PREGNANCY (HHS-HCC): Status: RESOLVED | Noted: 2024-12-10 | Resolved: 2025-07-30

## 2025-07-30 PROBLEM — Z3A.39 39 WEEKS GESTATION OF PREGNANCY (HHS-HCC): Status: RESOLVED | Noted: 2024-12-09 | Resolved: 2025-07-30

## 2025-07-30 PROBLEM — Z67.91 RH NEGATIVE, MATERNAL (HHS-HCC): Status: RESOLVED | Noted: 2023-03-03 | Resolved: 2025-07-30

## 2025-07-30 PROBLEM — Z87.59 HISTORY OF VACUUM EXTRACTION ASSISTED DELIVERY: Status: RESOLVED | Noted: 2024-12-10 | Resolved: 2025-07-30

## 2025-07-30 PROBLEM — O26.899 RH NEGATIVE, MATERNAL (HHS-HCC): Status: RESOLVED | Noted: 2023-03-03 | Resolved: 2025-07-30

## 2025-07-30 PROBLEM — O99.019 ANTEPARTUM ANEMIA: Status: RESOLVED | Noted: 2025-04-18 | Resolved: 2025-07-30

## 2025-07-30 PROCEDURE — 0503F POSTPARTUM CARE VISIT: CPT | Performed by: OBSTETRICS & GYNECOLOGY

## 2025-07-30 ASSESSMENT — EDINBURGH POSTNATAL DEPRESSION SCALE (EPDS)
I HAVE LOOKED FORWARD WITH ENJOYMENT TO THINGS: AS MUCH AS I EVER DID
I HAVE FELT SCARED OR PANICKY FOR NO GOOD REASON: YES, SOMETIMES
I HAVE BEEN SO UNHAPPY THAT I HAVE BEEN CRYING: NO, NEVER
I HAVE BEEN SO UNHAPPY THAT I HAVE HAD DIFFICULTY SLEEPING: NOT AT ALL
THINGS HAVE BEEN GETTING ON TOP OF ME: NO, MOST OF THE TIME I HAVE COPED QUITE WELL
TOTAL SCORE: 7
I HAVE BLAMED MYSELF UNNECESSARILY WHEN THINGS WENT WRONG: NOT VERY OFTEN
I HAVE FELT SAD OR MISERABLE: NOT VERY OFTEN
I HAVE BEEN ABLE TO LAUGH AND SEE THE FUNNY SIDE OF THINGS: AS MUCH AS I ALWAYS COULD
I HAVE BEEN ANXIOUS OR WORRIED FOR NO GOOD REASON: YES, SOMETIMES
THE THOUGHT OF HARMING MYSELF HAS OCCURRED TO ME: NEVER

## 2025-07-30 ASSESSMENT — PAIN SCALES - GENERAL: PAINLEVEL_OUTOF10: 1

## 2025-09-03 ENCOUNTER — APPOINTMENT (OUTPATIENT)
Dept: OBSTETRICS AND GYNECOLOGY | Facility: CLINIC | Age: 30
End: 2025-09-03
Payer: COMMERCIAL

## 2025-09-08 ENCOUNTER — APPOINTMENT (OUTPATIENT)
Dept: OBSTETRICS AND GYNECOLOGY | Facility: CLINIC | Age: 30
End: 2025-09-08
Payer: COMMERCIAL

## (undated) DEVICE — FLOSEAL, MATRIX, HEMOSTATIC, FULL STERILE PREP, 5ML

## (undated) DEVICE — DRAPE PACK, CESAREAN SECTION, CUSTOM, UHC